# Patient Record
Sex: FEMALE | Race: WHITE | Employment: OTHER | URBAN - METROPOLITAN AREA
[De-identification: names, ages, dates, MRNs, and addresses within clinical notes are randomized per-mention and may not be internally consistent; named-entity substitution may affect disease eponyms.]

---

## 2019-12-16 ENCOUNTER — HOSPITAL ENCOUNTER (OUTPATIENT)
Age: 69
Setting detail: SPECIMEN
Discharge: HOME | End: 2019-12-16
Payer: MEDICARE

## 2019-12-16 LAB
ALBUMIN SERPL-MCNC: 4.3 GM/DL (ref 3.4–5)
ALP BLD-CCNC: 118 IU/L (ref 40–129)
ALT SERPL-CCNC: 26 U/L (ref 10–40)
ANION GAP SERPL CALCULATED.3IONS-SCNC: 18 MMOL/L (ref 4–16)
AST SERPL-CCNC: 18 IU/L (ref 15–37)
BASOPHILS ABSOLUTE: 0.1 K/CU MM
BASOPHILS RELATIVE PERCENT: 2.5 % (ref 0–1)
BILIRUB SERPL-MCNC: 0.2 MG/DL (ref 0–1)
BILIRUBIN DIRECT: 0.2 MG/DL (ref 0–0.3)
BILIRUBIN, INDIRECT: 0 MG/DL (ref 0–0.7)
BUN BLDV-MCNC: 19 MG/DL (ref 6–23)
CALCIUM SERPL-MCNC: 8.7 MG/DL (ref 8.3–10.6)
CHLORIDE BLD-SCNC: 101 MMOL/L (ref 99–110)
CO2: 23 MMOL/L (ref 21–32)
CREAT SERPL-MCNC: 1.2 MG/DL (ref 0.6–1.1)
DIFFERENTIAL TYPE: ABNORMAL
EOSINOPHILS ABSOLUTE: 0.2 K/CU MM
EOSINOPHILS RELATIVE PERCENT: 3.8 % (ref 0–3)
GFR AFRICAN AMERICAN: 54 ML/MIN/1.73M2
GFR NON-AFRICAN AMERICAN: 45 ML/MIN/1.73M2
GLUCOSE BLD-MCNC: 193 MG/DL (ref 70–99)
HCT VFR BLD CALC: 32 % (ref 37–47)
HEMOGLOBIN: 9.3 GM/DL (ref 12.5–16)
IMMATURE NEUTROPHIL %: 0.8 % (ref 0–0.43)
LYMPHOCYTES ABSOLUTE: 0.9 K/CU MM
LYMPHOCYTES RELATIVE PERCENT: 23.4 % (ref 24–44)
MCH RBC QN AUTO: 28.4 PG (ref 27–31)
MCHC RBC AUTO-ENTMCNC: 29.1 % (ref 32–36)
MCV RBC AUTO: 97.6 FL (ref 78–100)
MONOCYTES ABSOLUTE: 0.4 K/CU MM
MONOCYTES RELATIVE PERCENT: 9.9 % (ref 0–4)
NUCLEATED RBC %: 0 %
PDW BLD-RTO: 15.2 % (ref 11.7–14.9)
PLATELET # BLD: 189 K/CU MM (ref 140–440)
PMV BLD AUTO: 10.7 FL (ref 7.5–11.1)
POTASSIUM SERPL-SCNC: 4.1 MMOL/L (ref 3.5–5.1)
RBC # BLD: 3.28 M/CU MM (ref 4.2–5.4)
SEGMENTED NEUTROPHILS ABSOLUTE COUNT: 2.3 K/CU MM
SEGMENTED NEUTROPHILS RELATIVE PERCENT: 59.6 % (ref 36–66)
SODIUM BLD-SCNC: 142 MMOL/L (ref 135–145)
TOTAL IMMATURE NEUTOROPHIL: 0.03 K/CU MM
TOTAL NUCLEATED RBC: 0 K/CU MM
TOTAL PROTEIN: 6.4 GM/DL (ref 6.4–8.2)
WBC # BLD: 3.9 K/CU MM (ref 4–10.5)

## 2019-12-16 PROCEDURE — 80053 COMPREHEN METABOLIC PANEL: CPT

## 2019-12-16 PROCEDURE — 85025 COMPLETE CBC W/AUTO DIFF WBC: CPT

## 2019-12-16 PROCEDURE — 82248 BILIRUBIN DIRECT: CPT

## 2020-12-16 ENCOUNTER — HOSPITAL ENCOUNTER (OUTPATIENT)
Dept: CARDIAC REHAB | Age: 70
Setting detail: THERAPIES SERIES
Discharge: HOME | End: 2020-12-16
Payer: MEDICARE

## 2020-12-16 PROCEDURE — G0422 INTENS CARDIAC REHAB W/EXERC: HCPCS

## 2020-12-16 PROCEDURE — G0423 INTENS CARDIAC REHAB NO EXER: HCPCS

## 2020-12-21 ENCOUNTER — HOSPITAL ENCOUNTER (OUTPATIENT)
Dept: CARDIAC REHAB | Age: 70
Setting detail: THERAPIES SERIES
Discharge: HOME | End: 2020-12-21
Payer: MEDICARE

## 2020-12-21 LAB
GLUCOSE BLD-MCNC: 112 MG/DL (ref 70–99)
GLUCOSE BLD-MCNC: 137 MG/DL (ref 70–99)

## 2020-12-21 PROCEDURE — G0423 INTENS CARDIAC REHAB NO EXER: HCPCS

## 2020-12-21 PROCEDURE — 82962 GLUCOSE BLOOD TEST: CPT

## 2020-12-21 PROCEDURE — G0422 INTENS CARDIAC REHAB W/EXERC: HCPCS

## 2020-12-22 ENCOUNTER — HOSPITAL ENCOUNTER (OUTPATIENT)
Dept: CARDIAC REHAB | Age: 70
Setting detail: THERAPIES SERIES
Discharge: HOME | End: 2020-12-22
Payer: MEDICARE

## 2020-12-22 LAB
GLUCOSE BLD-MCNC: 121 MG/DL (ref 70–99)
GLUCOSE BLD-MCNC: 132 MG/DL (ref 70–99)

## 2020-12-22 PROCEDURE — G0423 INTENS CARDIAC REHAB NO EXER: HCPCS

## 2020-12-22 PROCEDURE — 82962 GLUCOSE BLOOD TEST: CPT

## 2020-12-22 PROCEDURE — G0422 INTENS CARDIAC REHAB W/EXERC: HCPCS

## 2020-12-24 ENCOUNTER — HOSPITAL ENCOUNTER (OUTPATIENT)
Dept: CARDIAC REHAB | Age: 70
Setting detail: THERAPIES SERIES
Discharge: HOME | End: 2020-12-24
Payer: MEDICARE

## 2020-12-24 LAB
GLUCOSE BLD-MCNC: 191 MG/DL (ref 70–99)
GLUCOSE BLD-MCNC: 262 MG/DL (ref 70–99)

## 2020-12-24 PROCEDURE — G0423 INTENS CARDIAC REHAB NO EXER: HCPCS

## 2020-12-24 PROCEDURE — G0422 INTENS CARDIAC REHAB W/EXERC: HCPCS

## 2020-12-24 PROCEDURE — 82962 GLUCOSE BLOOD TEST: CPT

## 2020-12-28 ENCOUNTER — HOSPITAL ENCOUNTER (OUTPATIENT)
Dept: CARDIAC REHAB | Age: 70
Setting detail: THERAPIES SERIES
Discharge: HOME | End: 2020-12-28
Payer: MEDICARE

## 2020-12-28 LAB — GLUCOSE BLD-MCNC: 134 MG/DL (ref 70–99)

## 2020-12-28 PROCEDURE — 82962 GLUCOSE BLOOD TEST: CPT

## 2020-12-28 PROCEDURE — G0423 INTENS CARDIAC REHAB NO EXER: HCPCS

## 2020-12-28 PROCEDURE — G0422 INTENS CARDIAC REHAB W/EXERC: HCPCS

## 2021-01-04 ENCOUNTER — HOSPITAL ENCOUNTER (OUTPATIENT)
Dept: CARDIAC REHAB | Age: 71
Setting detail: THERAPIES SERIES
Discharge: HOME | End: 2021-01-04
Payer: MEDICARE

## 2021-01-04 LAB
GLUCOSE BLD-MCNC: 119 MG/DL (ref 70–99)
GLUCOSE BLD-MCNC: 138 MG/DL (ref 70–99)

## 2021-01-04 PROCEDURE — G0422 INTENS CARDIAC REHAB W/EXERC: HCPCS

## 2021-01-04 PROCEDURE — 82962 GLUCOSE BLOOD TEST: CPT

## 2021-01-04 PROCEDURE — G0423 INTENS CARDIAC REHAB NO EXER: HCPCS

## 2021-01-05 ENCOUNTER — HOSPITAL ENCOUNTER (OUTPATIENT)
Dept: CARDIAC REHAB | Age: 71
Setting detail: THERAPIES SERIES
Discharge: HOME | End: 2021-01-05
Payer: MEDICARE

## 2021-01-05 LAB — GLUCOSE BLD-MCNC: 125 MG/DL (ref 70–99)

## 2021-01-05 PROCEDURE — G0422 INTENS CARDIAC REHAB W/EXERC: HCPCS

## 2021-01-05 PROCEDURE — G0423 INTENS CARDIAC REHAB NO EXER: HCPCS

## 2021-01-07 ENCOUNTER — HOSPITAL ENCOUNTER (OUTPATIENT)
Dept: CARDIAC REHAB | Age: 71
Setting detail: THERAPIES SERIES
Discharge: HOME | End: 2021-01-07
Payer: MEDICARE

## 2021-01-07 PROCEDURE — G0422 INTENS CARDIAC REHAB W/EXERC: HCPCS

## 2021-01-07 PROCEDURE — G0423 INTENS CARDIAC REHAB NO EXER: HCPCS

## 2021-01-12 ENCOUNTER — HOSPITAL ENCOUNTER (OUTPATIENT)
Dept: CARDIAC REHAB | Age: 71
Setting detail: THERAPIES SERIES
Discharge: HOME | End: 2021-01-12
Payer: MEDICARE

## 2021-01-12 PROCEDURE — G0423 INTENS CARDIAC REHAB NO EXER: HCPCS

## 2021-01-12 PROCEDURE — G0422 INTENS CARDIAC REHAB W/EXERC: HCPCS

## 2021-01-13 NOTE — PROGRESS NOTES
Outpatient intensive cardiac rehab nutrition one-on-one visit  01/12/2021  14:57-16:00    Client History:    Pertinent medications:insulin, ozempic, synthroid, will start chemo again 3 weeks on and 1 week off. Social hx: Lives with spouse. 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 Does not use tobacco. Drinks very little alcohol, sips of wine at passover. Spouse involved with grocery shopping. Pertinent Medical hx: valve replacement, myeloma with hx BMT, hyperinsulinemia, DM    Activity habits: Less active in past years with cancer dx and treatments and recent cardiac surgery. Used to ride stationary bike at home 30-40 minutes 3 times per week. Increasing activity past 3 weeks in rehab program.     Food/nutrition habits: Eats small meals, snacks. Breakfast often shredded wheat. Lunch may be homemade soup, leftovers. Eats small portions of meats, variety. Avoids shellfish due to allergy. Does not keep kosher. Trying to increase fruits. Lactose intolerant. Drinks frappuccino made with almond milk, medium size 2 times per week. Drinks mainly water. Anthropometrics:    Ht:61.5\" Wt: 223#   IBW: 107-117#   % of IBW: 190 BMI: 41.5, obesity class III    Weight hx: Prior to cardiac surgery weight 250#. Gradual loss over past months. Would like to continue loss. Diet hx: weight loss diets over adult life, very low calorie at times 500 per day with little loss. Admits best loss involved exercise.     Estimated needs: 1785 calories (based on current weight with [facility] and low active) reduce for loss to 1500     Readiness to change: important, very ready, confident in self care, good family support  Nutrition dx: Nutrition-related knowledge deficit related to limited exposure to meal plan as evidenced by recent cardiac surgery, desire for information    Nutrition Prescription: Consistent meal plan with CHO controlled meals, low sodium 1500 mg per day Nutrition Interventions: Discussion regarding Pritikin meal plan guidelines. Encouraged to continue working on increasing daily fruit/vegetable intake, limiting added sugars.      Nutrition related goals: reduce frappuccino to once per week and try with less added syrup, add vegetables to meals    Adherence/barriers to goals: no barriers at this time    Primary learner: attended alone  Education materials provided: Pritikin shopping list and CHO handouts  Method of education:   Explanation    Handouts   Teach back     Response to education:    Verbalized understanding          Rec/plan: Continue exercise and education classes in cardiac rehab program

## 2021-01-14 ENCOUNTER — HOSPITAL ENCOUNTER (OUTPATIENT)
Dept: CARDIAC REHAB | Age: 71
Setting detail: THERAPIES SERIES
Discharge: HOME | End: 2021-01-14
Payer: MEDICARE

## 2021-01-14 PROCEDURE — G0423 INTENS CARDIAC REHAB NO EXER: HCPCS

## 2021-01-14 PROCEDURE — G0422 INTENS CARDIAC REHAB W/EXERC: HCPCS

## 2021-01-18 ENCOUNTER — HOSPITAL ENCOUNTER (OUTPATIENT)
Dept: CARDIAC REHAB | Age: 71
Setting detail: THERAPIES SERIES
Discharge: HOME | End: 2021-01-18
Payer: MEDICARE

## 2021-01-18 PROCEDURE — G0423 INTENS CARDIAC REHAB NO EXER: HCPCS

## 2021-01-18 PROCEDURE — G0422 INTENS CARDIAC REHAB W/EXERC: HCPCS

## 2021-01-19 ENCOUNTER — HOSPITAL ENCOUNTER (OUTPATIENT)
Dept: CARDIAC REHAB | Age: 71
Setting detail: THERAPIES SERIES
Discharge: HOME | End: 2021-01-19
Payer: MEDICARE

## 2021-01-19 PROCEDURE — G0423 INTENS CARDIAC REHAB NO EXER: HCPCS

## 2021-01-19 PROCEDURE — 93798 PHYS/QHP OP CAR RHAB W/ECG: CPT

## 2021-01-19 PROCEDURE — G0422 INTENS CARDIAC REHAB W/EXERC: HCPCS

## 2021-01-21 ENCOUNTER — HOSPITAL ENCOUNTER (OUTPATIENT)
Dept: CARDIAC REHAB | Age: 71
Setting detail: THERAPIES SERIES
Discharge: HOME | End: 2021-01-21
Payer: MEDICARE

## 2021-01-25 ENCOUNTER — HOSPITAL ENCOUNTER (OUTPATIENT)
Dept: CARDIAC REHAB | Age: 71
Setting detail: THERAPIES SERIES
Discharge: HOME | End: 2021-01-25
Payer: MEDICARE

## 2021-01-25 PROCEDURE — G0423 INTENS CARDIAC REHAB NO EXER: HCPCS

## 2021-01-25 PROCEDURE — G0422 INTENS CARDIAC REHAB W/EXERC: HCPCS

## 2021-01-26 ENCOUNTER — HOSPITAL ENCOUNTER (OUTPATIENT)
Dept: CARDIAC REHAB | Age: 71
Setting detail: THERAPIES SERIES
Discharge: HOME | End: 2021-01-26
Payer: MEDICARE

## 2021-01-26 PROCEDURE — G0423 INTENS CARDIAC REHAB NO EXER: HCPCS

## 2021-01-26 PROCEDURE — G0422 INTENS CARDIAC REHAB W/EXERC: HCPCS

## 2021-01-28 ENCOUNTER — HOSPITAL ENCOUNTER (OUTPATIENT)
Dept: CARDIAC REHAB | Age: 71
Setting detail: THERAPIES SERIES
Discharge: HOME | End: 2021-01-28
Payer: MEDICARE

## 2021-01-28 PROCEDURE — G0423 INTENS CARDIAC REHAB NO EXER: HCPCS

## 2021-01-28 PROCEDURE — G0422 INTENS CARDIAC REHAB W/EXERC: HCPCS

## 2021-02-02 ENCOUNTER — HOSPITAL ENCOUNTER (OUTPATIENT)
Dept: CARDIAC REHAB | Age: 71
Setting detail: THERAPIES SERIES
Discharge: HOME | End: 2021-02-02
Payer: MEDICARE

## 2021-02-02 PROCEDURE — G0423 INTENS CARDIAC REHAB NO EXER: HCPCS

## 2021-02-02 PROCEDURE — G0422 INTENS CARDIAC REHAB W/EXERC: HCPCS

## 2021-02-04 ENCOUNTER — HOSPITAL ENCOUNTER (OUTPATIENT)
Dept: CARDIAC REHAB | Age: 71
Setting detail: THERAPIES SERIES
Discharge: HOME | End: 2021-02-04
Payer: MEDICARE

## 2021-02-04 PROCEDURE — G0423 INTENS CARDIAC REHAB NO EXER: HCPCS

## 2021-02-04 PROCEDURE — G0422 INTENS CARDIAC REHAB W/EXERC: HCPCS

## 2021-02-08 ENCOUNTER — HOSPITAL ENCOUNTER (OUTPATIENT)
Dept: CARDIAC REHAB | Age: 71
Setting detail: THERAPIES SERIES
Discharge: HOME | End: 2021-02-08
Payer: MEDICARE

## 2021-02-08 PROCEDURE — G0423 INTENS CARDIAC REHAB NO EXER: HCPCS

## 2021-02-08 PROCEDURE — G0422 INTENS CARDIAC REHAB W/EXERC: HCPCS

## 2021-02-11 ENCOUNTER — HOSPITAL ENCOUNTER (OUTPATIENT)
Dept: CARDIAC REHAB | Age: 71
Setting detail: THERAPIES SERIES
Discharge: HOME | End: 2021-02-11
Payer: MEDICARE

## 2021-02-11 PROCEDURE — G0422 INTENS CARDIAC REHAB W/EXERC: HCPCS

## 2021-02-11 PROCEDURE — G0423 INTENS CARDIAC REHAB NO EXER: HCPCS

## 2021-02-15 ENCOUNTER — HOSPITAL ENCOUNTER (OUTPATIENT)
Dept: CARDIAC REHAB | Age: 71
Setting detail: THERAPIES SERIES
Discharge: HOME | End: 2021-02-15
Payer: MEDICARE

## 2021-02-15 PROCEDURE — G0422 INTENS CARDIAC REHAB W/EXERC: HCPCS

## 2021-02-15 PROCEDURE — G0423 INTENS CARDIAC REHAB NO EXER: HCPCS

## 2021-03-01 ENCOUNTER — HOSPITAL ENCOUNTER (OUTPATIENT)
Dept: CARDIAC REHAB | Age: 71
Setting detail: THERAPIES SERIES
Discharge: HOME | End: 2021-03-01
Payer: MEDICARE

## 2021-03-01 PROCEDURE — G0422 INTENS CARDIAC REHAB W/EXERC: HCPCS

## 2021-03-01 PROCEDURE — G0423 INTENS CARDIAC REHAB NO EXER: HCPCS

## 2021-03-02 ENCOUNTER — HOSPITAL ENCOUNTER (OUTPATIENT)
Dept: CARDIAC REHAB | Age: 71
Setting detail: THERAPIES SERIES
Discharge: HOME | End: 2021-03-02
Payer: MEDICARE

## 2021-03-02 PROCEDURE — G0423 INTENS CARDIAC REHAB NO EXER: HCPCS

## 2021-03-02 PROCEDURE — G0422 INTENS CARDIAC REHAB W/EXERC: HCPCS

## 2021-03-04 ENCOUNTER — HOSPITAL ENCOUNTER (OUTPATIENT)
Dept: CARDIAC REHAB | Age: 71
Setting detail: THERAPIES SERIES
Discharge: HOME | End: 2021-03-04
Payer: MEDICARE

## 2021-03-04 PROCEDURE — G0422 INTENS CARDIAC REHAB W/EXERC: HCPCS

## 2021-03-04 PROCEDURE — G0423 INTENS CARDIAC REHAB NO EXER: HCPCS

## 2021-03-08 ENCOUNTER — HOSPITAL ENCOUNTER (OUTPATIENT)
Dept: CARDIAC REHAB | Age: 71
Setting detail: THERAPIES SERIES
Discharge: HOME | End: 2021-03-08
Payer: MEDICARE

## 2021-03-08 PROCEDURE — G0422 INTENS CARDIAC REHAB W/EXERC: HCPCS

## 2021-03-08 PROCEDURE — G0423 INTENS CARDIAC REHAB NO EXER: HCPCS

## 2021-03-09 ENCOUNTER — HOSPITAL ENCOUNTER (OUTPATIENT)
Dept: CARDIAC REHAB | Age: 71
Setting detail: THERAPIES SERIES
Discharge: HOME | End: 2021-03-09
Payer: MEDICARE

## 2021-03-09 PROCEDURE — G0423 INTENS CARDIAC REHAB NO EXER: HCPCS

## 2021-03-09 PROCEDURE — G0422 INTENS CARDIAC REHAB W/EXERC: HCPCS

## 2021-08-16 ENCOUNTER — HOSPITAL ENCOUNTER (OUTPATIENT)
Age: 71
Discharge: HOME | End: 2021-08-16
Payer: MEDICARE

## 2021-08-16 LAB
BASOPHILS ABSOLUTE: 0 K/CU MM
BASOPHILS RELATIVE PERCENT: 0.3 % (ref 0–1)
C-REACTIVE PROTEIN, HIGH SENSITIVITY: 1.1 MG/L
DIFFERENTIAL TYPE: ABNORMAL
EOSINOPHILS ABSOLUTE: 0.1 K/CU MM
EOSINOPHILS RELATIVE PERCENT: 0.8 % (ref 0–3)
ERYTHROCYTE SEDIMENTATION RATE: 45 MM/HR (ref 0–30)
HCT VFR BLD CALC: 38.4 % (ref 37–47)
HEMOGLOBIN: 11.7 GM/DL (ref 12.5–16)
IMMATURE NEUTROPHIL %: 1 % (ref 0–0.43)
LYMPHOCYTES ABSOLUTE: 0.8 K/CU MM
LYMPHOCYTES RELATIVE PERCENT: 13.1 % (ref 24–44)
MCH RBC QN AUTO: 30.6 PG (ref 27–31)
MCHC RBC AUTO-ENTMCNC: 30.5 % (ref 32–36)
MCV RBC AUTO: 100.5 FL (ref 78–100)
MONOCYTES ABSOLUTE: 0.7 K/CU MM
MONOCYTES RELATIVE PERCENT: 10.6 % (ref 0–4)
NUCLEATED RBC %: 0.3 %
PDW BLD-RTO: 14.5 % (ref 11.7–14.9)
PLATELET # BLD: 191 K/CU MM (ref 140–440)
PMV BLD AUTO: 12 FL (ref 7.5–11.1)
RBC # BLD: 3.82 M/CU MM (ref 4.2–5.4)
SEGMENTED NEUTROPHILS ABSOLUTE COUNT: 4.5 K/CU MM
SEGMENTED NEUTROPHILS RELATIVE PERCENT: 74.2 % (ref 36–66)
TOTAL IMMATURE NEUTOROPHIL: 0.06 K/CU MM
TOTAL NUCLEATED RBC: 0 K/CU MM
WBC # BLD: 6.1 K/CU MM (ref 4–10.5)

## 2021-08-16 PROCEDURE — 85025 COMPLETE CBC W/AUTO DIFF WBC: CPT

## 2021-08-16 PROCEDURE — 86140 C-REACTIVE PROTEIN: CPT

## 2021-08-16 PROCEDURE — 36415 COLL VENOUS BLD VENIPUNCTURE: CPT

## 2021-08-16 PROCEDURE — 85652 RBC SED RATE AUTOMATED: CPT

## 2022-04-19 ENCOUNTER — HOSPITAL ENCOUNTER (OUTPATIENT)
Dept: ULTRASOUND IMAGING | Age: 72
Discharge: HOME | End: 2022-04-19
Payer: MEDICARE

## 2022-04-19 DIAGNOSIS — R60.0 LEG EDEMA: ICD-10-CM

## 2022-04-19 PROCEDURE — 93971 EXTREMITY STUDY: CPT

## 2023-01-30 ENCOUNTER — HOSPITAL ENCOUNTER (OUTPATIENT)
Age: 73
Discharge: HOME | End: 2023-01-30
Payer: MEDICARE

## 2023-01-30 LAB — CORTISOL - AM: 1.1 UG/DL (ref 6–18.4)

## 2023-01-30 PROCEDURE — 82533 TOTAL CORTISOL: CPT

## 2023-01-30 PROCEDURE — 36415 COLL VENOUS BLD VENIPUNCTURE: CPT

## 2023-07-14 ENCOUNTER — APPOINTMENT (OUTPATIENT)
Dept: GENERAL RADIOLOGY | Age: 73
DRG: 178 | End: 2023-07-14
Payer: MEDICARE

## 2023-07-14 ENCOUNTER — APPOINTMENT (OUTPATIENT)
Dept: CT IMAGING | Age: 73
DRG: 178 | End: 2023-07-14
Payer: MEDICARE

## 2023-07-14 ENCOUNTER — HOSPITAL ENCOUNTER (INPATIENT)
Age: 73
LOS: 3 days | Discharge: HOME | DRG: 178 | End: 2023-07-17
Attending: EMERGENCY MEDICINE | Admitting: STUDENT IN AN ORGANIZED HEALTH CARE EDUCATION/TRAINING PROGRAM
Payer: MEDICARE

## 2023-07-14 DIAGNOSIS — E83.42 HYPOMAGNESEMIA: ICD-10-CM

## 2023-07-14 DIAGNOSIS — N17.9 AKI (ACUTE KIDNEY INJURY) (HCC): Primary | ICD-10-CM

## 2023-07-14 DIAGNOSIS — E04.1 THYROID NODULE: ICD-10-CM

## 2023-07-14 LAB
ALBUMIN SERPL-MCNC: 4.4 GM/DL (ref 3.4–5)
ALP BLD-CCNC: 97 IU/L (ref 40–129)
ALT SERPL-CCNC: 11 U/L (ref 10–40)
ANION GAP SERPL CALCULATED.3IONS-SCNC: 16 MMOL/L (ref 4–16)
AST SERPL-CCNC: 13 IU/L (ref 15–37)
BACTERIA: ABNORMAL /HPF
BANDED NEUTROPHILS ABSOLUTE COUNT: 0.36 K/CU MM
BANDED NEUTROPHILS RELATIVE PERCENT: 7 % (ref 5–11)
BASOPHILS ABSOLUTE: 0.1 K/CU MM
BASOPHILS RELATIVE PERCENT: 1 % (ref 0–1)
BILIRUB SERPL-MCNC: 0.4 MG/DL (ref 0–1)
BILIRUBIN URINE: NEGATIVE MG/DL
BLOOD, URINE: ABNORMAL
BUN SERPL-MCNC: 30 MG/DL (ref 6–23)
CALCIUM SERPL-MCNC: 9.6 MG/DL (ref 8.3–10.6)
CHLORIDE BLD-SCNC: 98 MMOL/L (ref 99–110)
CLARITY: CLEAR
CO2: 24 MMOL/L (ref 21–32)
COLOR: YELLOW
CREAT SERPL-MCNC: 2.6 MG/DL (ref 0.6–1.1)
DIFFERENTIAL TYPE: ABNORMAL
EOSINOPHILS ABSOLUTE: 0.6 K/CU MM
EOSINOPHILS RELATIVE PERCENT: 12 % (ref 0–3)
GFR SERPL CREATININE-BSD FRML MDRD: 19 ML/MIN/1.73M2
GLUCOSE SERPL-MCNC: 143 MG/DL (ref 70–99)
GLUCOSE, URINE: NEGATIVE MG/DL
HCT VFR BLD CALC: 36.7 % (ref 37–47)
HEMOGLOBIN: 10.8 GM/DL (ref 12.5–16)
KETONES, URINE: NEGATIVE MG/DL
LACTIC ACID, SEPSIS: 1.3 MMOL/L (ref 0.5–1.9)
LEUKOCYTE ESTERASE, URINE: ABNORMAL
LYMPHOCYTES ABSOLUTE: 0.3 K/CU MM
LYMPHOCYTES RELATIVE PERCENT: 6 % (ref 24–44)
MAGNESIUM: 1.1 MG/DL (ref 1.8–2.4)
MCH RBC QN AUTO: 29.6 PG (ref 27–31)
MCHC RBC AUTO-ENTMCNC: 29.4 % (ref 32–36)
MCV RBC AUTO: 100.5 FL (ref 78–100)
METAMYELOCYTES ABSOLUTE COUNT: 0.15 K/CU MM
METAMYELOCYTES PERCENT: 3 %
MONOCYTES ABSOLUTE: 0.4 K/CU MM
MONOCYTES RELATIVE PERCENT: 7 % (ref 0–4)
MUCUS: ABNORMAL HPF
MYELOCYTE PERCENT: 2 %
MYELOCYTES ABSOLUTE COUNT: 0.1 K/CU MM
NITRITE URINE, QUANTITATIVE: NEGATIVE
PDW BLD-RTO: 15.4 % (ref 11.7–14.9)
PH, URINE: 6 (ref 5–8)
PLATELET # BLD: 154 K/CU MM (ref 140–440)
PMV BLD AUTO: 10.1 FL (ref 7.5–11.1)
POTASSIUM SERPL-SCNC: 3.9 MMOL/L (ref 3.5–5.1)
PROTEIN UA: ABNORMAL MG/DL
RBC # BLD: 3.65 M/CU MM (ref 4.2–5.4)
RBC URINE: 3 /HPF (ref 0–6)
SEGMENTED NEUTROPHILS ABSOLUTE COUNT: 3.1 K/CU MM
SEGMENTED NEUTROPHILS RELATIVE PERCENT: 62 % (ref 36–66)
SODIUM BLD-SCNC: 138 MMOL/L (ref 135–145)
SPECIFIC GRAVITY UA: 1.01 (ref 1–1.03)
TOTAL PROTEIN: 7.5 GM/DL (ref 6.4–8.2)
TRICHOMONAS: ABNORMAL /HPF
UROBILINOGEN, URINE: 0.2 MG/DL (ref 0.2–1)
WBC # BLD: 5.1 K/CU MM (ref 4–10.5)
WBC CLUMP: ABNORMAL /HPF
WBC UA: 43 /HPF (ref 0–5)
YEAST: ABNORMAL /HPF

## 2023-07-14 PROCEDURE — 2580000003 HC RX 258: Performed by: EMERGENCY MEDICINE

## 2023-07-14 PROCEDURE — 87088 URINE BACTERIA CULTURE: CPT

## 2023-07-14 PROCEDURE — 70450 CT HEAD/BRAIN W/O DYE: CPT

## 2023-07-14 PROCEDURE — 80053 COMPREHEN METABOLIC PANEL: CPT

## 2023-07-14 PROCEDURE — 72125 CT NECK SPINE W/O DYE: CPT

## 2023-07-14 PROCEDURE — 99285 EMERGENCY DEPT VISIT HI MDM: CPT

## 2023-07-14 PROCEDURE — 85007 BL SMEAR W/DIFF WBC COUNT: CPT

## 2023-07-14 PROCEDURE — 83735 ASSAY OF MAGNESIUM: CPT

## 2023-07-14 PROCEDURE — 93005 ELECTROCARDIOGRAM TRACING: CPT | Performed by: EMERGENCY MEDICINE

## 2023-07-14 PROCEDURE — 83605 ASSAY OF LACTIC ACID: CPT

## 2023-07-14 PROCEDURE — 96366 THER/PROPH/DIAG IV INF ADDON: CPT

## 2023-07-14 PROCEDURE — 87086 URINE CULTURE/COLONY COUNT: CPT

## 2023-07-14 PROCEDURE — 6360000002 HC RX W HCPCS: Performed by: EMERGENCY MEDICINE

## 2023-07-14 PROCEDURE — 96365 THER/PROPH/DIAG IV INF INIT: CPT

## 2023-07-14 PROCEDURE — 85027 COMPLETE CBC AUTOMATED: CPT

## 2023-07-14 PROCEDURE — 81001 URINALYSIS AUTO W/SCOPE: CPT

## 2023-07-14 PROCEDURE — 87186 SC STD MICRODIL/AGAR DIL: CPT

## 2023-07-14 PROCEDURE — 71045 X-RAY EXAM CHEST 1 VIEW: CPT

## 2023-07-14 PROCEDURE — 1200000000 HC SEMI PRIVATE

## 2023-07-14 RX ORDER — METOPROLOL SUCCINATE 50 MG/1
50 TABLET, EXTENDED RELEASE ORAL DAILY
COMMUNITY
Start: 2021-01-27

## 2023-07-14 RX ORDER — INSULIN GLARGINE 100 [IU]/ML
INJECTION, SOLUTION SUBCUTANEOUS
COMMUNITY
Start: 2023-06-15

## 2023-07-14 RX ORDER — 0.9 % SODIUM CHLORIDE 0.9 %
1000 INTRAVENOUS SOLUTION INTRAVENOUS ONCE
Status: COMPLETED | OUTPATIENT
Start: 2023-07-14 | End: 2023-07-14

## 2023-07-14 RX ORDER — OXYCODONE HYDROCHLORIDE 5 MG/1
TABLET ORAL
Status: ON HOLD | COMMUNITY
Start: 2023-07-06 | End: 2023-07-15

## 2023-07-14 RX ORDER — FLUCONAZOLE 150 MG/1
150 TABLET ORAL WEEKLY
Status: ON HOLD | COMMUNITY
Start: 2023-06-19 | End: 2023-07-17 | Stop reason: HOSPADM

## 2023-07-14 RX ORDER — CETIRIZINE 2.4 MG/ML
SOLUTION/ DROPS OPHTHALMIC
COMMUNITY
Start: 2023-06-04

## 2023-07-14 RX ORDER — LEVOTHYROXINE SODIUM 88 UG/1
1 TABLET ORAL
COMMUNITY
Start: 2023-01-31

## 2023-07-14 RX ORDER — ACYCLOVIR 400 MG/1
400 TABLET ORAL 2 TIMES DAILY
COMMUNITY
Start: 2022-03-29

## 2023-07-14 RX ORDER — DIPHENHYDRAMINE HCL 50 MG
50 CAPSULE ORAL PRN
COMMUNITY

## 2023-07-14 RX ORDER — ALBUTEROL SULFATE 90 UG/1
1-2 AEROSOL, METERED RESPIRATORY (INHALATION) 4 TIMES DAILY PRN
COMMUNITY

## 2023-07-14 RX ORDER — GABAPENTIN 300 MG/1
300 CAPSULE ORAL 4 TIMES DAILY
Status: ON HOLD | COMMUNITY
Start: 2023-05-21 | End: 2023-07-17 | Stop reason: HOSPADM

## 2023-07-14 RX ORDER — LORATADINE 10 MG/1
TABLET ORAL DAILY
COMMUNITY

## 2023-07-14 RX ORDER — MAGNESIUM SULFATE IN WATER 40 MG/ML
2000 INJECTION, SOLUTION INTRAVENOUS ONCE
Status: COMPLETED | OUTPATIENT
Start: 2023-07-14 | End: 2023-07-15

## 2023-07-14 RX ORDER — HYOSCYAMINE SULFATE 0.125 MG
TABLET,DISINTEGRATING ORAL EVERY 4 HOURS PRN
COMMUNITY

## 2023-07-14 RX ORDER — 0.9 % SODIUM CHLORIDE 0.9 %
1000 INTRAVENOUS SOLUTION INTRAVENOUS ONCE
Status: COMPLETED | OUTPATIENT
Start: 2023-07-14 | End: 2023-07-15

## 2023-07-14 RX ORDER — ASPIRIN 81 MG/1
81 TABLET, CHEWABLE ORAL DAILY
COMMUNITY

## 2023-07-14 RX ORDER — LEVOTHYROXINE SODIUM 0.07 MG/1
TABLET ORAL
COMMUNITY
Start: 2023-06-30

## 2023-07-14 RX ORDER — ESCITALOPRAM OXALATE 20 MG/1
1 TABLET ORAL DAILY
COMMUNITY
Start: 2022-12-14

## 2023-07-14 RX ORDER — INSULIN LISPRO 100 [IU]/ML
INJECTION, SOLUTION INTRAVENOUS; SUBCUTANEOUS
COMMUNITY
Start: 2020-11-06

## 2023-07-14 RX ORDER — ROSUVASTATIN CALCIUM 20 MG/1
20 TABLET, COATED ORAL DAILY
Qty: 30 TABLET | Refills: 11 | COMMUNITY
Start: 2023-02-14 | End: 2024-02-14

## 2023-07-14 RX ORDER — DEXAMETHASONE 4 MG/1
TABLET ORAL
COMMUNITY
Start: 2023-04-10

## 2023-07-14 RX ORDER — MAGNESIUM SULFATE IN WATER 40 MG/ML
2000 INJECTION, SOLUTION INTRAVENOUS ONCE
Status: DISCONTINUED | OUTPATIENT
Start: 2023-07-14 | End: 2023-07-15

## 2023-07-14 RX ORDER — FLUTICASONE FUROATE AND VILANTEROL 100; 25 UG/1; UG/1
1 POWDER RESPIRATORY (INHALATION) DAILY
COMMUNITY
Start: 2020-11-13

## 2023-07-14 RX ORDER — SEMAGLUTIDE 1.34 MG/ML
INJECTION, SOLUTION SUBCUTANEOUS
COMMUNITY
Start: 2022-08-24

## 2023-07-14 RX ORDER — PANTOPRAZOLE SODIUM 40 MG/1
1 TABLET, DELAYED RELEASE ORAL DAILY
COMMUNITY
Start: 2020-10-09

## 2023-07-14 RX ORDER — LIDOCAINE 50 MG/G
PATCH TOPICAL
COMMUNITY
Start: 2023-01-17

## 2023-07-14 RX ORDER — ASCORBIC ACID 500 MG
500 TABLET ORAL
COMMUNITY

## 2023-07-14 RX ADMIN — MAGNESIUM SULFATE HEPTAHYDRATE 2000 MG: 40 INJECTION, SOLUTION INTRAVENOUS at 20:58

## 2023-07-14 RX ADMIN — SODIUM CHLORIDE 1000 ML: 9 INJECTION, SOLUTION INTRAVENOUS at 22:38

## 2023-07-14 RX ADMIN — SODIUM CHLORIDE 1000 ML: 9 INJECTION, SOLUTION INTRAVENOUS at 21:11

## 2023-07-14 ASSESSMENT — PAIN DESCRIPTION - LOCATION: LOCATION: NECK;SHOULDER;KNEE

## 2023-07-14 ASSESSMENT — PAIN DESCRIPTION - PAIN TYPE: TYPE: CHRONIC PAIN

## 2023-07-14 NOTE — ED TRIAGE NOTES
Pt to ED today stating she tested positive for covid today. States yesterday she was running fever highest being 100.8.  states she also fell last night twice because she was so weak. Pt worried for being positive for covid. Pt did have right kidney removal this past Wednesday.

## 2023-07-15 LAB
ANION GAP SERPL CALCULATED.3IONS-SCNC: 14 MMOL/L (ref 4–16)
B PARAP IS1001 DNA NPH QL NAA+NON-PROBE: NOT DETECTED
B PERT.PT PRMT NPH QL NAA+NON-PROBE: NOT DETECTED
BASOPHILS ABSOLUTE: 0.1 K/CU MM
BASOPHILS RELATIVE PERCENT: 1 % (ref 0–1)
BUN SERPL-MCNC: 27 MG/DL (ref 6–23)
C PNEUM DNA NPH QL NAA+NON-PROBE: NOT DETECTED
CALCIUM SERPL-MCNC: 8.7 MG/DL (ref 8.3–10.6)
CHLORIDE BLD-SCNC: 104 MMOL/L (ref 99–110)
CO2: 21 MMOL/L (ref 21–32)
CREAT SERPL-MCNC: 2.4 MG/DL (ref 0.6–1.1)
DIFFERENTIAL TYPE: ABNORMAL
EOSINOPHILS ABSOLUTE: 0.4 K/CU MM
EOSINOPHILS RELATIVE PERCENT: 7.5 % (ref 0–3)
ESTIMATED AVERAGE GLUCOSE: 128 MG/DL
FLUAV H1 2009 PAN RNA NPH NAA+NON-PROBE: NOT DETECTED
FLUAV H1 RNA NPH QL NAA+NON-PROBE: NOT DETECTED
FLUAV H3 RNA NPH QL NAA+NON-PROBE: NOT DETECTED
FLUAV RNA NPH QL NAA+NON-PROBE: NOT DETECTED
FLUBV RNA NPH QL NAA+NON-PROBE: NOT DETECTED
GFR SERPL CREATININE-BSD FRML MDRD: 21 ML/MIN/1.73M2
GLUCOSE BLD-MCNC: 131 MG/DL (ref 70–99)
GLUCOSE BLD-MCNC: 133 MG/DL (ref 70–99)
GLUCOSE SERPL-MCNC: 136 MG/DL (ref 70–99)
HADV DNA NPH QL NAA+NON-PROBE: NOT DETECTED
HBA1C MFR BLD: 6.1 % (ref 4.2–6.3)
HCOV 229E RNA NPH QL NAA+NON-PROBE: NOT DETECTED
HCOV HKU1 RNA NPH QL NAA+NON-PROBE: NOT DETECTED
HCOV NL63 RNA NPH QL NAA+NON-PROBE: NOT DETECTED
HCOV OC43 RNA NPH QL NAA+NON-PROBE: NOT DETECTED
HCT VFR BLD CALC: 31.3 % (ref 37–47)
HEMOGLOBIN: 9.7 GM/DL (ref 12.5–16)
HMPV RNA NPH QL NAA+NON-PROBE: NOT DETECTED
HPIV1 RNA NPH QL NAA+NON-PROBE: NOT DETECTED
HPIV2 RNA NPH QL NAA+NON-PROBE: NOT DETECTED
HPIV3 RNA NPH QL NAA+NON-PROBE: NOT DETECTED
HPIV4 RNA NPH QL NAA+NON-PROBE: NOT DETECTED
IMMATURE NEUTROPHIL %: 1.2 % (ref 0–0.43)
INR BLD: 0.9 INDEX
LYMPHOCYTES ABSOLUTE: 0.3 K/CU MM
LYMPHOCYTES RELATIVE PERCENT: 6.4 % (ref 24–44)
M PNEUMO DNA NPH QL NAA+NON-PROBE: NOT DETECTED
MAGNESIUM: 2.2 MG/DL (ref 1.8–2.4)
MCH RBC QN AUTO: 30.7 PG (ref 27–31)
MCHC RBC AUTO-ENTMCNC: 31 % (ref 32–36)
MCV RBC AUTO: 99.1 FL (ref 78–100)
MONOCYTES ABSOLUTE: 0.7 K/CU MM
MONOCYTES RELATIVE PERCENT: 12.7 % (ref 0–4)
NUCLEATED RBC %: 0 %
PDW BLD-RTO: 15.7 % (ref 11.7–14.9)
PLATELET # BLD: 135 K/CU MM (ref 140–440)
PMV BLD AUTO: 9.7 FL (ref 7.5–11.1)
POTASSIUM SERPL-SCNC: 4.1 MMOL/L (ref 3.5–5.1)
PROTHROMBIN TIME: 12.8 SECONDS (ref 11.7–14.5)
RBC # BLD: 3.16 M/CU MM (ref 4.2–5.4)
RSV RNA NPH QL NAA+NON-PROBE: NOT DETECTED
RV+EV RNA NPH QL NAA+NON-PROBE: NOT DETECTED
SARS-COV-2 RNA NPH QL NAA+NON-PROBE: ABNORMAL
SEGMENTED NEUTROPHILS ABSOLUTE COUNT: 3.7 K/CU MM
SEGMENTED NEUTROPHILS RELATIVE PERCENT: 71.2 % (ref 36–66)
SODIUM BLD-SCNC: 139 MMOL/L (ref 135–145)
TOTAL CK: 135 IU/L (ref 26–140)
TOTAL IMMATURE NEUTOROPHIL: 0.06 K/CU MM
TOTAL NUCLEATED RBC: 0 K/CU MM
TSH SERPL DL<=0.005 MIU/L-ACNC: 0.22 UIU/ML (ref 0.27–4.2)
WBC # BLD: 5.2 K/CU MM (ref 4–10.5)

## 2023-07-15 PROCEDURE — 83036 HEMOGLOBIN GLYCOSYLATED A1C: CPT

## 2023-07-15 PROCEDURE — 94664 DEMO&/EVAL PT USE INHALER: CPT

## 2023-07-15 PROCEDURE — 2580000003 HC RX 258: Performed by: INTERNAL MEDICINE

## 2023-07-15 PROCEDURE — 84439 ASSAY OF FREE THYROXINE: CPT

## 2023-07-15 PROCEDURE — 6370000000 HC RX 637 (ALT 250 FOR IP): Performed by: STUDENT IN AN ORGANIZED HEALTH CARE EDUCATION/TRAINING PROGRAM

## 2023-07-15 PROCEDURE — 6360000002 HC RX W HCPCS: Performed by: STUDENT IN AN ORGANIZED HEALTH CARE EDUCATION/TRAINING PROGRAM

## 2023-07-15 PROCEDURE — 0202U NFCT DS 22 TRGT SARS-COV-2: CPT

## 2023-07-15 PROCEDURE — 2580000003 HC RX 258: Performed by: STUDENT IN AN ORGANIZED HEALTH CARE EDUCATION/TRAINING PROGRAM

## 2023-07-15 PROCEDURE — 85610 PROTHROMBIN TIME: CPT

## 2023-07-15 PROCEDURE — 82306 VITAMIN D 25 HYDROXY: CPT

## 2023-07-15 PROCEDURE — 1200000000 HC SEMI PRIVATE

## 2023-07-15 PROCEDURE — 85025 COMPLETE CBC W/AUTO DIFF WBC: CPT

## 2023-07-15 PROCEDURE — 36415 COLL VENOUS BLD VENIPUNCTURE: CPT

## 2023-07-15 PROCEDURE — 80048 BASIC METABOLIC PNL TOTAL CA: CPT

## 2023-07-15 PROCEDURE — 82550 ASSAY OF CK (CPK): CPT

## 2023-07-15 PROCEDURE — 83735 ASSAY OF MAGNESIUM: CPT

## 2023-07-15 PROCEDURE — 94640 AIRWAY INHALATION TREATMENT: CPT

## 2023-07-15 PROCEDURE — 2500000003 HC RX 250 WO HCPCS: Performed by: INTERNAL MEDICINE

## 2023-07-15 PROCEDURE — 82962 GLUCOSE BLOOD TEST: CPT

## 2023-07-15 PROCEDURE — 94761 N-INVAS EAR/PLS OXIMETRY MLT: CPT

## 2023-07-15 PROCEDURE — 6360000002 HC RX W HCPCS: Performed by: INTERNAL MEDICINE

## 2023-07-15 PROCEDURE — 84443 ASSAY THYROID STIM HORMONE: CPT

## 2023-07-15 RX ORDER — SODIUM CHLORIDE, SODIUM LACTATE, POTASSIUM CHLORIDE, CALCIUM CHLORIDE 600; 310; 30; 20 MG/100ML; MG/100ML; MG/100ML; MG/100ML
INJECTION, SOLUTION INTRAVENOUS CONTINUOUS
Status: DISPENSED | OUTPATIENT
Start: 2023-07-15 | End: 2023-07-16

## 2023-07-15 RX ORDER — SODIUM CHLORIDE 0.9 % (FLUSH) 0.9 %
5-40 SYRINGE (ML) INJECTION EVERY 12 HOURS SCHEDULED
Status: DISCONTINUED | OUTPATIENT
Start: 2023-07-15 | End: 2023-07-17 | Stop reason: HOSPADM

## 2023-07-15 RX ORDER — LEVOTHYROXINE SODIUM 88 UG/1
88 TABLET ORAL DAILY
Status: DISCONTINUED | OUTPATIENT
Start: 2023-07-15 | End: 2023-07-17 | Stop reason: HOSPADM

## 2023-07-15 RX ORDER — ONDANSETRON 4 MG/1
4 TABLET, ORALLY DISINTEGRATING ORAL EVERY 8 HOURS PRN
Status: DISCONTINUED | OUTPATIENT
Start: 2023-07-15 | End: 2023-07-17 | Stop reason: HOSPADM

## 2023-07-15 RX ORDER — SODIUM CHLORIDE, SODIUM LACTATE, POTASSIUM CHLORIDE, CALCIUM CHLORIDE 600; 310; 30; 20 MG/100ML; MG/100ML; MG/100ML; MG/100ML
INJECTION, SOLUTION INTRAVENOUS CONTINUOUS
Status: ACTIVE | OUTPATIENT
Start: 2023-07-15 | End: 2023-07-15

## 2023-07-15 RX ORDER — ACETAMINOPHEN 325 MG/1
650 TABLET ORAL EVERY 6 HOURS PRN
Status: DISCONTINUED | OUTPATIENT
Start: 2023-07-15 | End: 2023-07-17 | Stop reason: HOSPADM

## 2023-07-15 RX ORDER — SODIUM CHLORIDE 9 MG/ML
INJECTION, SOLUTION INTRAVENOUS PRN
Status: DISCONTINUED | OUTPATIENT
Start: 2023-07-15 | End: 2023-07-17 | Stop reason: HOSPADM

## 2023-07-15 RX ORDER — GABAPENTIN 100 MG/1
100 CAPSULE ORAL 4 TIMES DAILY
Status: DISCONTINUED | OUTPATIENT
Start: 2023-07-15 | End: 2023-07-17 | Stop reason: HOSPADM

## 2023-07-15 RX ORDER — INSULIN LISPRO 100 [IU]/ML
0-4 INJECTION, SOLUTION INTRAVENOUS; SUBCUTANEOUS NIGHTLY
Status: DISCONTINUED | OUTPATIENT
Start: 2023-07-15 | End: 2023-07-17 | Stop reason: HOSPADM

## 2023-07-15 RX ORDER — POLYETHYLENE GLYCOL 3350 17 G/17G
17 POWDER, FOR SOLUTION ORAL DAILY PRN
Status: DISCONTINUED | OUTPATIENT
Start: 2023-07-15 | End: 2023-07-17 | Stop reason: HOSPADM

## 2023-07-15 RX ORDER — BUDESONIDE AND FORMOTEROL FUMARATE DIHYDRATE 80; 4.5 UG/1; UG/1
2 AEROSOL RESPIRATORY (INHALATION)
Status: DISCONTINUED | OUTPATIENT
Start: 2023-07-15 | End: 2023-07-17 | Stop reason: HOSPADM

## 2023-07-15 RX ORDER — ASPIRIN 81 MG/1
81 TABLET, CHEWABLE ORAL DAILY
Status: DISCONTINUED | OUTPATIENT
Start: 2023-07-15 | End: 2023-07-17 | Stop reason: HOSPADM

## 2023-07-15 RX ORDER — ACETAMINOPHEN 650 MG/1
650 SUPPOSITORY RECTAL EVERY 6 HOURS PRN
Status: DISCONTINUED | OUTPATIENT
Start: 2023-07-15 | End: 2023-07-17 | Stop reason: HOSPADM

## 2023-07-15 RX ORDER — ACYCLOVIR 200 MG/1
400 CAPSULE ORAL 2 TIMES DAILY
Status: DISCONTINUED | OUTPATIENT
Start: 2023-07-15 | End: 2023-07-17 | Stop reason: HOSPADM

## 2023-07-15 RX ORDER — ONDANSETRON 2 MG/ML
4 INJECTION INTRAMUSCULAR; INTRAVENOUS EVERY 6 HOURS PRN
Status: DISCONTINUED | OUTPATIENT
Start: 2023-07-15 | End: 2023-07-17 | Stop reason: HOSPADM

## 2023-07-15 RX ORDER — PANTOPRAZOLE SODIUM 40 MG/1
40 TABLET, DELAYED RELEASE ORAL DAILY
Status: DISCONTINUED | OUTPATIENT
Start: 2023-07-15 | End: 2023-07-17 | Stop reason: HOSPADM

## 2023-07-15 RX ORDER — GLUCAGON 1 MG/ML
1 KIT INJECTION PRN
Status: DISCONTINUED | OUTPATIENT
Start: 2023-07-15 | End: 2023-07-17 | Stop reason: HOSPADM

## 2023-07-15 RX ORDER — INSULIN GLARGINE 100 [IU]/ML
15 INJECTION, SOLUTION SUBCUTANEOUS NIGHTLY
Status: DISCONTINUED | OUTPATIENT
Start: 2023-07-15 | End: 2023-07-17 | Stop reason: HOSPADM

## 2023-07-15 RX ORDER — ESCITALOPRAM OXALATE 10 MG/1
20 TABLET ORAL DAILY
Status: DISCONTINUED | OUTPATIENT
Start: 2023-07-15 | End: 2023-07-17 | Stop reason: HOSPADM

## 2023-07-15 RX ORDER — SODIUM CHLORIDE 0.9 % (FLUSH) 0.9 %
5-40 SYRINGE (ML) INJECTION PRN
Status: DISCONTINUED | OUTPATIENT
Start: 2023-07-15 | End: 2023-07-17 | Stop reason: HOSPADM

## 2023-07-15 RX ORDER — LANOLIN ALCOHOL/MO/W.PET/CERES
1000 CREAM (GRAM) TOPICAL DAILY
Status: DISCONTINUED | OUTPATIENT
Start: 2023-07-15 | End: 2023-07-17 | Stop reason: HOSPADM

## 2023-07-15 RX ORDER — ROSUVASTATIN CALCIUM 20 MG/1
20 TABLET, COATED ORAL NIGHTLY
Status: DISCONTINUED | OUTPATIENT
Start: 2023-07-16 | End: 2023-07-17 | Stop reason: HOSPADM

## 2023-07-15 RX ORDER — OXYCODONE HYDROCHLORIDE 5 MG/1
5 TABLET ORAL EVERY 6 HOURS PRN
Status: DISCONTINUED | OUTPATIENT
Start: 2023-07-15 | End: 2023-07-17 | Stop reason: HOSPADM

## 2023-07-15 RX ORDER — POTASSIUM CHLORIDE 7.45 MG/ML
10 INJECTION INTRAVENOUS PRN
Status: DISCONTINUED | OUTPATIENT
Start: 2023-07-15 | End: 2023-07-17 | Stop reason: HOSPADM

## 2023-07-15 RX ORDER — ASCORBIC ACID 500 MG
500 TABLET ORAL DAILY
Status: DISCONTINUED | OUTPATIENT
Start: 2023-07-15 | End: 2023-07-17 | Stop reason: HOSPADM

## 2023-07-15 RX ORDER — MAGNESIUM SULFATE IN WATER 40 MG/ML
2000 INJECTION, SOLUTION INTRAVENOUS PRN
Status: DISCONTINUED | OUTPATIENT
Start: 2023-07-15 | End: 2023-07-17 | Stop reason: HOSPADM

## 2023-07-15 RX ORDER — GUAIFENESIN 200 MG/10ML
200 LIQUID ORAL EVERY 4 HOURS PRN
Status: DISCONTINUED | OUTPATIENT
Start: 2023-07-15 | End: 2023-07-17 | Stop reason: HOSPADM

## 2023-07-15 RX ORDER — DEXTROSE MONOHYDRATE 100 MG/ML
INJECTION, SOLUTION INTRAVENOUS CONTINUOUS PRN
Status: DISCONTINUED | OUTPATIENT
Start: 2023-07-15 | End: 2023-07-17 | Stop reason: HOSPADM

## 2023-07-15 RX ORDER — ENOXAPARIN SODIUM 100 MG/ML
30 INJECTION SUBCUTANEOUS EVERY EVENING
Status: DISCONTINUED | OUTPATIENT
Start: 2023-07-15 | End: 2023-07-17 | Stop reason: HOSPADM

## 2023-07-15 RX ORDER — METOPROLOL SUCCINATE 50 MG/1
50 TABLET, EXTENDED RELEASE ORAL DAILY
Status: DISCONTINUED | OUTPATIENT
Start: 2023-07-15 | End: 2023-07-17 | Stop reason: HOSPADM

## 2023-07-15 RX ORDER — ROSUVASTATIN CALCIUM 20 MG/1
20 TABLET, COATED ORAL DAILY
Status: DISCONTINUED | OUTPATIENT
Start: 2023-07-15 | End: 2023-07-15

## 2023-07-15 RX ORDER — INSULIN LISPRO 100 [IU]/ML
0-4 INJECTION, SOLUTION INTRAVENOUS; SUBCUTANEOUS
Status: DISCONTINUED | OUTPATIENT
Start: 2023-07-15 | End: 2023-07-17 | Stop reason: HOSPADM

## 2023-07-15 RX ADMIN — GABAPENTIN 100 MG: 100 CAPSULE ORAL at 22:36

## 2023-07-15 RX ADMIN — GABAPENTIN 100 MG: 100 CAPSULE ORAL at 13:55

## 2023-07-15 RX ADMIN — ENOXAPARIN SODIUM 30 MG: 100 INJECTION SUBCUTANEOUS at 16:57

## 2023-07-15 RX ADMIN — ACYCLOVIR 400 MG: 200 CAPSULE ORAL at 22:35

## 2023-07-15 RX ADMIN — GABAPENTIN 100 MG: 100 CAPSULE ORAL at 09:14

## 2023-07-15 RX ADMIN — METOPROLOL SUCCINATE 50 MG: 50 TABLET, EXTENDED RELEASE ORAL at 09:18

## 2023-07-15 RX ADMIN — CEFTRIAXONE SODIUM 1000 MG: 1 INJECTION, POWDER, FOR SOLUTION INTRAMUSCULAR; INTRAVENOUS at 09:30

## 2023-07-15 RX ADMIN — OXYCODONE HYDROCHLORIDE AND ACETAMINOPHEN 500 MG: 500 TABLET ORAL at 09:18

## 2023-07-15 RX ADMIN — SODIUM CHLORIDE, POTASSIUM CHLORIDE, SODIUM LACTATE AND CALCIUM CHLORIDE: 600; 310; 30; 20 INJECTION, SOLUTION INTRAVENOUS at 03:52

## 2023-07-15 RX ADMIN — CYANOCOBALAMIN TAB 1000 MCG 1000 MCG: 1000 TAB at 09:21

## 2023-07-15 RX ADMIN — ACETAMINOPHEN 650 MG: 325 TABLET ORAL at 09:14

## 2023-07-15 RX ADMIN — ASPIRIN 81 MG: 81 TABLET, CHEWABLE ORAL at 02:28

## 2023-07-15 RX ADMIN — SODIUM CHLORIDE, POTASSIUM CHLORIDE, SODIUM LACTATE AND CALCIUM CHLORIDE: 600; 310; 30; 20 INJECTION, SOLUTION INTRAVENOUS at 22:58

## 2023-07-15 RX ADMIN — ACETAMINOPHEN 650 MG: 325 TABLET ORAL at 15:23

## 2023-07-15 RX ADMIN — SODIUM CHLORIDE, PRESERVATIVE FREE 10 ML: 5 INJECTION INTRAVENOUS at 03:44

## 2023-07-15 RX ADMIN — SODIUM CHLORIDE: 9 INJECTION, SOLUTION INTRAVENOUS at 09:29

## 2023-07-15 RX ADMIN — BUDESONIDE AND FORMOTEROL FUMARATE DIHYDRATE 2 PUFF: 80; 4.5 AEROSOL RESPIRATORY (INHALATION) at 20:50

## 2023-07-15 RX ADMIN — INSULIN GLARGINE 15 UNITS: 100 INJECTION, SOLUTION SUBCUTANEOUS at 22:35

## 2023-07-15 RX ADMIN — ASPIRIN 81 MG: 81 TABLET, CHEWABLE ORAL at 22:36

## 2023-07-15 RX ADMIN — ACETAMINOPHEN 650 MG: 325 TABLET ORAL at 22:59

## 2023-07-15 RX ADMIN — BUDESONIDE AND FORMOTEROL FUMARATE DIHYDRATE 2 PUFF: 80; 4.5 AEROSOL RESPIRATORY (INHALATION) at 11:40

## 2023-07-15 RX ADMIN — PANTOPRAZOLE SODIUM 40 MG: 40 TABLET, DELAYED RELEASE ORAL at 05:30

## 2023-07-15 RX ADMIN — GABAPENTIN 100 MG: 100 CAPSULE ORAL at 16:57

## 2023-07-15 RX ADMIN — GUAIFENESIN 200 MG: 100 SOLUTION ORAL at 11:30

## 2023-07-15 RX ADMIN — ACETAMINOPHEN 650 MG: 325 TABLET ORAL at 02:27

## 2023-07-15 RX ADMIN — SODIUM CHLORIDE, POTASSIUM CHLORIDE, SODIUM LACTATE AND CALCIUM CHLORIDE: 600; 310; 30; 20 INJECTION, SOLUTION INTRAVENOUS at 14:10

## 2023-07-15 RX ADMIN — MAGNESIUM SULFATE HEPTAHYDRATE 2000 MG: 40 INJECTION, SOLUTION INTRAVENOUS at 03:43

## 2023-07-15 RX ADMIN — ACYCLOVIR 400 MG: 200 CAPSULE ORAL at 09:18

## 2023-07-15 RX ADMIN — SODIUM CHLORIDE, PRESERVATIVE FREE 10 ML: 5 INJECTION INTRAVENOUS at 22:37

## 2023-07-15 RX ADMIN — ESCITALOPRAM OXALATE 20 MG: 10 TABLET ORAL at 09:18

## 2023-07-15 RX ADMIN — SODIUM CHLORIDE, PRESERVATIVE FREE 10 ML: 5 INJECTION INTRAVENOUS at 09:34

## 2023-07-15 RX ADMIN — GUAIFENESIN 200 MG: 100 SOLUTION ORAL at 22:34

## 2023-07-15 ASSESSMENT — ENCOUNTER SYMPTOMS
ABDOMINAL PAIN: 0
CHOKING: 0
NAUSEA: 0
WHEEZING: 0
DIARRHEA: 0
STRIDOR: 0
CHEST TIGHTNESS: 0
BACK PAIN: 0
EYE PAIN: 0
VOMITING: 0
SHORTNESS OF BREATH: 0
CONSTIPATION: 0
COUGH: 1
BLOOD IN STOOL: 0
ABDOMINAL DISTENTION: 0

## 2023-07-15 ASSESSMENT — PAIN SCALES - WONG BAKER
WONGBAKER_NUMERICALRESPONSE: 4
WONGBAKER_NUMERICALRESPONSE: 4

## 2023-07-15 ASSESSMENT — PAIN DESCRIPTION - LOCATION
LOCATION: BACK
LOCATION: ABDOMEN
LOCATION: BACK;HIP

## 2023-07-15 ASSESSMENT — PAIN DESCRIPTION - ORIENTATION
ORIENTATION: LEFT;RIGHT;LOWER
ORIENTATION: LEFT
ORIENTATION: RIGHT;LEFT
ORIENTATION: LOWER

## 2023-07-15 ASSESSMENT — PAIN DESCRIPTION - DESCRIPTORS
DESCRIPTORS: ACHING

## 2023-07-15 ASSESSMENT — PAIN - FUNCTIONAL ASSESSMENT
PAIN_FUNCTIONAL_ASSESSMENT: PREVENTS OR INTERFERES SOME ACTIVE ACTIVITIES AND ADLS
PAIN_FUNCTIONAL_ASSESSMENT: PREVENTS OR INTERFERES SOME ACTIVE ACTIVITIES AND ADLS
PAIN_FUNCTIONAL_ASSESSMENT: ACTIVITIES ARE NOT PREVENTED

## 2023-07-15 ASSESSMENT — PAIN SCALES - GENERAL
PAINLEVEL_OUTOF10: 4
PAINLEVEL_OUTOF10: 5
PAINLEVEL_OUTOF10: 8
PAINLEVEL_OUTOF10: 5
PAINLEVEL_OUTOF10: 6
PAINLEVEL_OUTOF10: 4

## 2023-07-15 NOTE — ED PROVIDER NOTES
Nick Mead was checked out to me by Dr. Lilli Parker. Please see his/her initial documentation for details of the patient's ED presentation, physical exam and completed studies. In brief, Nick Mead is a 72 y.o. female that presents with increased weakness, fevers, upper respiratory tract infection symptoms, and frequent falls in the setting of recent nephrectomy last week. Patient did test positive for COVID today.     Labs  Results for orders placed or performed during the hospital encounter of 07/14/23   CBC with Auto Differential   Result Value Ref Range    WBC 5.1 4.0 - 10.5 K/CU MM    RBC 3.65 (L) 4.2 - 5.4 M/CU MM    Hemoglobin 10.8 (L) 12.5 - 16.0 GM/DL    Hematocrit 36.7 (L) 37 - 47 %    .5 (H) 78 - 100 FL    MCH 29.6 27 - 31 PG    MCHC 29.4 (L) 32.0 - 36.0 %    RDW 15.4 (H) 11.7 - 14.9 %    Platelets 091 345 - 919 K/CU MM    MPV 10.1 7.5 - 11.1 FL    Myelocyte Percent 2 (H) 0.0 %    Metamyelocytes Relative 3 (H) 0.0 %    Bands Relative 7 5 - 11 %    Segs Relative 62.0 36 - 66 %    Eosinophils % 12.0 (H) 0 - 3 %    Basophils % 1.0 0 - 1 %    Lymphocytes % 6.0 (L) 24 - 44 %    Monocytes % 7.0 (H) 0 - 4 %    Myelocytes Absolute 0.10 K/CU MM    Metamyelocytes Absolute 0.15 K/CU MM    Bands Absolute 0.36 K/CU MM    Segs Absolute 3.1 K/CU MM    Eosinophils Absolute 0.6 K/CU MM    Basophils Absolute 0.1 K/CU MM    Lymphocytes Absolute 0.3 K/CU MM    Monocytes Absolute 0.4 K/CU MM    Differential Type MANUAL DIFFERENTIAL    Comprehensive Metabolic Panel   Result Value Ref Range    Sodium 138 135 - 145 MMOL/L    Potassium 3.9 3.5 - 5.1 MMOL/L    Chloride 98 (L) 99 - 110 mMol/L    CO2 24 21 - 32 MMOL/L    BUN 30 (H) 6 - 23 MG/DL    Creatinine 2.6 (H) 0.6 - 1.1 MG/DL    Est, Glom Filt Rate 19 (L) >60 mL/min/1.73m2    Glucose 143 (H) 70 - 99 MG/DL    Calcium 9.6 8.3 - 10.6 MG/DL    Albumin 4.4 3.4 - 5.0 GM/DL    Total Protein 7.5 6.4 - 8.2 GM/DL    Total Bilirubin 0.4 0.0 - 1.0 MG/DL    ALT 11 10
ischemia  Prolonged QT  Abnormal ECG  No previous ECGs available         Radiographs (if obtained): The following radiograph was interpreted by myself in the absence of a radiologist:  x radiologist's Report reviewed at time of ED visit:  XR CHEST PORTABLE    Result Date: 7/14/2023  EXAMINATION: ONE XRAY VIEW OF THE CHEST 7/14/2023 7:56 pm COMPARISON: None. HISTORY: COVID-19 positive. FINDINGS: Patient is slightly rotated. Normal cardiomediastinal silhouette. Sternotomy changes. No focal airspace disease. No significant pleural effusion. No pneumothorax. Osteopenia. Negative chest radiograph. CC/HPI Summary, DDx, ED Course, and Reassessment:   Patient reports that she was positive for COVID-19 at home. On my assessment, she is without hypoxia or respiratory distress. Chest x-ray is clear without infiltrates. Labs obtained given patient's significant weakness. Labs are notable for acute renal injury with severe hypomagnesemia. Have initiated IV fluids as well as intravenous replacement. No change in baseline anemia. No significant leukocytosis although she does have left shift with immature white cells. Urinalysis ordered and pending. Patient has reassuring abdominal exam.  Given her head injury and recent fall, she sent for CT head and cervical spine. Patient was given the following medications:  Medications   magnesium sulfate 2000 mg in 50 mL IVPB premix (2,000 mg IntraVENous New Bag 7/14/23 2058)   0.9 % sodium chloride bolus (has no administration in time range)   0.9 % sodium chloride bolus (1,000 mLs IntraVENous New Bag 7/14/23 2111)       EKG (if obtained): (All EKG's are interpreted by myself in the absence of a cardiologist) normal sinus rhythm at 74. Left axis with good R wave progression. Nonspecific T wave changes without ST elevation. Corrected QT is 492 ms. No prior EKG available for comparison.     Chronic conditions affecting care: Multiple myeloma, HTN,

## 2023-07-16 LAB
25(OH)D3 SERPL-MCNC: 29.76 NG/ML
ANION GAP SERPL CALCULATED.3IONS-SCNC: 15 MMOL/L (ref 4–16)
BASOPHILS ABSOLUTE: 0.1 K/CU MM
BASOPHILS RELATIVE PERCENT: 1.5 % (ref 0–1)
BUN SERPL-MCNC: 25 MG/DL (ref 6–23)
CALCIUM SERPL-MCNC: 8.5 MG/DL (ref 8.3–10.6)
CHLORIDE BLD-SCNC: 104 MMOL/L (ref 99–110)
CO2: 20 MMOL/L (ref 21–32)
CREAT SERPL-MCNC: 2.5 MG/DL (ref 0.6–1.1)
CREATININE URINE: 75.6 MG/DL (ref 28–217)
DIFFERENTIAL TYPE: ABNORMAL
EOSINOPHILS ABSOLUTE: 0.3 K/CU MM
EOSINOPHILS RELATIVE PERCENT: 9.9 % (ref 0–3)
GFR SERPL CREATININE-BSD FRML MDRD: 20 ML/MIN/1.73M2
GLUCOSE BLD-MCNC: 124 MG/DL (ref 70–99)
GLUCOSE BLD-MCNC: 129 MG/DL (ref 70–99)
GLUCOSE BLD-MCNC: 145 MG/DL (ref 70–99)
GLUCOSE BLD-MCNC: 183 MG/DL (ref 70–99)
GLUCOSE SERPL-MCNC: 112 MG/DL (ref 70–99)
HCT VFR BLD CALC: 32 % (ref 37–47)
HEMOGLOBIN: 9.6 GM/DL (ref 12.5–16)
IMMATURE NEUTROPHIL %: 0.9 % (ref 0–0.43)
LYMPHOCYTES ABSOLUTE: 0.3 K/CU MM
LYMPHOCYTES RELATIVE PERCENT: 8.7 % (ref 24–44)
MAGNESIUM: 1.6 MG/DL (ref 1.8–2.4)
MCH RBC QN AUTO: 30.5 PG (ref 27–31)
MCHC RBC AUTO-ENTMCNC: 30 % (ref 32–36)
MCV RBC AUTO: 101.6 FL (ref 78–100)
MONOCYTES ABSOLUTE: 0.5 K/CU MM
MONOCYTES RELATIVE PERCENT: 13.4 % (ref 0–4)
NUCLEATED RBC %: 0 %
PDW BLD-RTO: 15.6 % (ref 11.7–14.9)
PHOSPHORUS: 4 MG/DL (ref 2.5–4.9)
PLATELET # BLD: 121 K/CU MM (ref 140–440)
PMV BLD AUTO: 10.1 FL (ref 7.5–11.1)
POTASSIUM SERPL-SCNC: 4.1 MMOL/L (ref 3.5–5.1)
PROCALCITONIN SERPL-MCNC: 0.18 NG/ML
PROT/CREAT RATIO, UR: 0.6
RBC # BLD: 3.15 M/CU MM (ref 4.2–5.4)
SEGMENTED NEUTROPHILS ABSOLUTE COUNT: 2.2 K/CU MM
SEGMENTED NEUTROPHILS RELATIVE PERCENT: 65.6 % (ref 36–66)
SODIUM BLD-SCNC: 139 MMOL/L (ref 135–145)
T4 FREE SERPL-MCNC: 0.99 NG/DL (ref 0.9–1.8)
TOTAL IMMATURE NEUTOROPHIL: 0.03 K/CU MM
TOTAL NUCLEATED RBC: 0 K/CU MM
URINE TOTAL PROTEIN: 46.5 MG/DL
WBC # BLD: 3.4 K/CU MM (ref 4–10.5)

## 2023-07-16 PROCEDURE — 87070 CULTURE OTHR SPECIMN AEROBIC: CPT

## 2023-07-16 PROCEDURE — 87205 SMEAR GRAM STAIN: CPT

## 2023-07-16 PROCEDURE — 2580000003 HC RX 258: Performed by: STUDENT IN AN ORGANIZED HEALTH CARE EDUCATION/TRAINING PROGRAM

## 2023-07-16 PROCEDURE — 2580000003 HC RX 258: Performed by: INTERNAL MEDICINE

## 2023-07-16 PROCEDURE — 82306 VITAMIN D 25 HYDROXY: CPT

## 2023-07-16 PROCEDURE — 94761 N-INVAS EAR/PLS OXIMETRY MLT: CPT

## 2023-07-16 PROCEDURE — 83735 ASSAY OF MAGNESIUM: CPT

## 2023-07-16 PROCEDURE — 94640 AIRWAY INHALATION TREATMENT: CPT

## 2023-07-16 PROCEDURE — 84145 PROCALCITONIN (PCT): CPT

## 2023-07-16 PROCEDURE — 94664 DEMO&/EVAL PT USE INHALER: CPT

## 2023-07-16 PROCEDURE — 6360000002 HC RX W HCPCS: Performed by: INTERNAL MEDICINE

## 2023-07-16 PROCEDURE — 6360000002 HC RX W HCPCS: Performed by: STUDENT IN AN ORGANIZED HEALTH CARE EDUCATION/TRAINING PROGRAM

## 2023-07-16 PROCEDURE — 2500000003 HC RX 250 WO HCPCS: Performed by: INTERNAL MEDICINE

## 2023-07-16 PROCEDURE — 82962 GLUCOSE BLOOD TEST: CPT

## 2023-07-16 PROCEDURE — 6370000000 HC RX 637 (ALT 250 FOR IP): Performed by: INTERNAL MEDICINE

## 2023-07-16 PROCEDURE — 84156 ASSAY OF PROTEIN URINE: CPT

## 2023-07-16 PROCEDURE — 6370000000 HC RX 637 (ALT 250 FOR IP): Performed by: FAMILY MEDICINE

## 2023-07-16 PROCEDURE — 84439 ASSAY OF FREE THYROXINE: CPT

## 2023-07-16 PROCEDURE — 1200000000 HC SEMI PRIVATE

## 2023-07-16 PROCEDURE — 85025 COMPLETE CBC W/AUTO DIFF WBC: CPT

## 2023-07-16 PROCEDURE — 84100 ASSAY OF PHOSPHORUS: CPT

## 2023-07-16 PROCEDURE — 6370000000 HC RX 637 (ALT 250 FOR IP): Performed by: STUDENT IN AN ORGANIZED HEALTH CARE EDUCATION/TRAINING PROGRAM

## 2023-07-16 PROCEDURE — 36415 COLL VENOUS BLD VENIPUNCTURE: CPT

## 2023-07-16 PROCEDURE — 80048 BASIC METABOLIC PNL TOTAL CA: CPT

## 2023-07-16 PROCEDURE — 82570 ASSAY OF URINE CREATININE: CPT

## 2023-07-16 RX ORDER — MAGNESIUM SULFATE IN WATER 40 MG/ML
2000 INJECTION, SOLUTION INTRAVENOUS ONCE
Status: COMPLETED | OUTPATIENT
Start: 2023-07-16 | End: 2023-07-16

## 2023-07-16 RX ORDER — CHOLECALCIFEROL (VITAMIN D3) 125 MCG
10 CAPSULE ORAL NIGHTLY PRN
Status: DISCONTINUED | OUTPATIENT
Start: 2023-07-17 | End: 2023-07-17

## 2023-07-16 RX ADMIN — ACETAMINOPHEN 650 MG: 325 TABLET ORAL at 14:02

## 2023-07-16 RX ADMIN — ACETAMINOPHEN 650 MG: 325 TABLET ORAL at 03:48

## 2023-07-16 RX ADMIN — ROSUVASTATIN CALCIUM 20 MG: 20 TABLET, FILM COATED ORAL at 22:35

## 2023-07-16 RX ADMIN — ACYCLOVIR 400 MG: 200 CAPSULE ORAL at 09:21

## 2023-07-16 RX ADMIN — ACYCLOVIR 400 MG: 200 CAPSULE ORAL at 22:35

## 2023-07-16 RX ADMIN — OXYCODONE HYDROCHLORIDE AND ACETAMINOPHEN 500 MG: 500 TABLET ORAL at 09:22

## 2023-07-16 RX ADMIN — SODIUM CHLORIDE, PRESERVATIVE FREE 10 ML: 5 INJECTION INTRAVENOUS at 22:36

## 2023-07-16 RX ADMIN — PANTOPRAZOLE SODIUM 40 MG: 40 TABLET, DELAYED RELEASE ORAL at 03:51

## 2023-07-16 RX ADMIN — CEFTRIAXONE SODIUM 1000 MG: 1 INJECTION, POWDER, FOR SOLUTION INTRAMUSCULAR; INTRAVENOUS at 09:21

## 2023-07-16 RX ADMIN — ESCITALOPRAM OXALATE 20 MG: 10 TABLET ORAL at 09:21

## 2023-07-16 RX ADMIN — SODIUM CHLORIDE: 9 INJECTION, SOLUTION INTRAVENOUS at 09:20

## 2023-07-16 RX ADMIN — METOPROLOL SUCCINATE 50 MG: 50 TABLET, EXTENDED RELEASE ORAL at 09:22

## 2023-07-16 RX ADMIN — MAGNESIUM SULFATE HEPTAHYDRATE 2000 MG: 40 INJECTION, SOLUTION INTRAVENOUS at 10:27

## 2023-07-16 RX ADMIN — BUDESONIDE AND FORMOTEROL FUMARATE DIHYDRATE 2 PUFF: 80; 4.5 AEROSOL RESPIRATORY (INHALATION) at 07:37

## 2023-07-16 RX ADMIN — ACETAMINOPHEN 650 MG: 325 TABLET ORAL at 22:34

## 2023-07-16 RX ADMIN — LEVOTHYROXINE SODIUM 88 MCG: 0.09 TABLET ORAL at 03:47

## 2023-07-16 RX ADMIN — GABAPENTIN 100 MG: 100 CAPSULE ORAL at 14:02

## 2023-07-16 RX ADMIN — INSULIN GLARGINE 15 UNITS: 100 INJECTION, SOLUTION SUBCUTANEOUS at 22:37

## 2023-07-16 RX ADMIN — GABAPENTIN 100 MG: 100 CAPSULE ORAL at 16:58

## 2023-07-16 RX ADMIN — CYANOCOBALAMIN TAB 1000 MCG 1000 MCG: 1000 TAB at 09:21

## 2023-07-16 RX ADMIN — GABAPENTIN 100 MG: 100 CAPSULE ORAL at 09:21

## 2023-07-16 RX ADMIN — GUAIFENESIN 200 MG: 100 SOLUTION ORAL at 22:35

## 2023-07-16 RX ADMIN — TOPICAL ANESTHETIC: 200 SPRAY DENTAL; PERIODONTAL at 22:36

## 2023-07-16 RX ADMIN — ENOXAPARIN SODIUM 30 MG: 100 INJECTION SUBCUTANEOUS at 16:57

## 2023-07-16 RX ADMIN — GABAPENTIN 100 MG: 100 CAPSULE ORAL at 22:35

## 2023-07-16 ASSESSMENT — ENCOUNTER SYMPTOMS
VOMITING: 0
CHEST TIGHTNESS: 0
ABDOMINAL PAIN: 0
CHOKING: 0
BLOOD IN STOOL: 0
ABDOMINAL DISTENTION: 0
COUGH: 1
STRIDOR: 0
SHORTNESS OF BREATH: 0
EYE PAIN: 0
CONSTIPATION: 0
DIARRHEA: 0
NAUSEA: 0
BACK PAIN: 0
WHEEZING: 0

## 2023-07-16 ASSESSMENT — PAIN - FUNCTIONAL ASSESSMENT: PAIN_FUNCTIONAL_ASSESSMENT: PREVENTS OR INTERFERES SOME ACTIVE ACTIVITIES AND ADLS

## 2023-07-16 ASSESSMENT — PAIN DESCRIPTION - ORIENTATION
ORIENTATION: RIGHT;LEFT
ORIENTATION: LEFT;LOWER

## 2023-07-16 ASSESSMENT — PAIN DESCRIPTION - DESCRIPTORS
DESCRIPTORS: ACHING;SORE
DESCRIPTORS: ACHING

## 2023-07-16 ASSESSMENT — PAIN DESCRIPTION - LOCATION: LOCATION: BACK;SHOULDER

## 2023-07-16 ASSESSMENT — PAIN SCALES - GENERAL
PAINLEVEL_OUTOF10: 3
PAINLEVEL_OUTOF10: 3
PAINLEVEL_OUTOF10: 0

## 2023-07-16 ASSESSMENT — PAIN SCALES - WONG BAKER: WONGBAKER_NUMERICALRESPONSE: 0

## 2023-07-16 NOTE — CONSULTS
[address], [address] [place] [address], [address] [place] [address]  Phone: [phone]  Office Hours: 8:30AM - 4:30PM  Monday - Friday     Nephrology Service Consultation    Patient:  Theo Candelario  MRN: [id number]  Consulting physician:  Terrence Berman MD  Reason for Consult: SANDRA    History Obtained From:  patient, spouse, electronic medical record  PCP: Melissa Paniagua MD    HISTORY OF PRESENT ILLNESS:   The patient is a 72 y.o. female who presents with fever- found to have covid on 7/14  She had RCC and has undergone Nephrectomy by Dr Bryanna Lang. -[facility]-  She also ha s multiple myeloma on therapy at [facility]. She has not been eating or drinking as well following Nephrectomy and now with fever. Making urine  No hematuria  No recent contrast -since nephrectomy  Last creat I could find was from 2022 - 1.3-1.2  Not soa  No recent NSAIDs   Arville Dubin in the room when I evaluated her.       Past Medical History:        Diagnosis Date    Diabetes mellitus ([address])     Hyperlipidemia     Multiple myeloma (HCC)        Past Surgical History:        Procedure Laterality Date    KIDNEY REMOVAL Right        Medications:   Scheduled Meds:   magnesium sulfate  2,000 mg IntraVENous Once    aspirin  81 mg Oral Daily    escitalopram  20 mg Oral Daily    budesonide-formoterol  2 puff Inhalation BID    gabapentin  100 mg Oral 4x Daily    levothyroxine  88 mcg Oral Daily    metoprolol succinate  50 mg Oral Daily    pantoprazole  40 mg Oral Daily    sodium chloride flush  5-40 mL IntraVENous 2 times per day    enoxaparin  30 mg SubCUTAneous QPM    acyclovir  400 mg Oral BID    ascorbic acid  500 mg Oral Daily    insulin glargine  15 Units SubCUTAneous Nightly    insulin lispro  0-4 Units SubCUTAneous TID     insulin lispro  0-4 Units SubCUTAneous Nightly    cyanocobalamin  1,000 mcg Oral Daily    cefTRIAXone (ROCEPHIN) IV  1,000 mg IntraVENous Q24H    rosuvastatin  20 mg Oral Nightly     Continuous Infusions:   sodium chloride 25 mL/hr at

## 2023-07-17 VITALS
SYSTOLIC BLOOD PRESSURE: 153 MMHG | RESPIRATION RATE: 21 BRPM | HEIGHT: 62 IN | BODY MASS INDEX: 42.88 KG/M2 | HEART RATE: 64 BPM | WEIGHT: 233 LBS | OXYGEN SATURATION: 99 % | TEMPERATURE: 98.7 F | DIASTOLIC BLOOD PRESSURE: 84 MMHG

## 2023-07-17 LAB
ANION GAP SERPL CALCULATED.3IONS-SCNC: 15 MMOL/L (ref 4–16)
BASOPHILS ABSOLUTE: 0.1 K/CU MM
BASOPHILS RELATIVE PERCENT: 1.9 % (ref 0–1)
BUN SERPL-MCNC: 26 MG/DL (ref 6–23)
CALCIUM SERPL-MCNC: 8.8 MG/DL (ref 8.3–10.6)
CHLORIDE BLD-SCNC: 104 MMOL/L (ref 99–110)
CO2: 17 MMOL/L (ref 21–32)
CREAT SERPL-MCNC: 2.4 MG/DL (ref 0.6–1.1)
DIFFERENTIAL TYPE: ABNORMAL
EKG ATRIAL RATE: 74 BPM
EKG DIAGNOSIS: NORMAL
EKG P AXIS: 86 DEGREES
EKG P-R INTERVAL: 144 MS
EKG Q-T INTERVAL: 444 MS
EKG QRS DURATION: 122 MS
EKG QTC CALCULATION (BAZETT): 492 MS
EKG R AXIS: -30 DEGREES
EKG T AXIS: 86 DEGREES
EKG VENTRICULAR RATE: 74 BPM
EOSINOPHILS ABSOLUTE: 0.5 K/CU MM
EOSINOPHILS RELATIVE PERCENT: 16 % (ref 0–3)
GFR SERPL CREATININE-BSD FRML MDRD: 21 ML/MIN/1.73M2
GLUCOSE BLD-MCNC: 128 MG/DL (ref 70–99)
GLUCOSE BLD-MCNC: 130 MG/DL (ref 70–99)
GLUCOSE BLD-MCNC: 156 MG/DL (ref 70–99)
GLUCOSE SERPL-MCNC: 133 MG/DL (ref 70–99)
HCT VFR BLD CALC: 33.5 % (ref 37–47)
HEMOGLOBIN: 10.1 GM/DL (ref 12.5–16)
IMMATURE NEUTROPHIL %: 0.6 % (ref 0–0.43)
LYMPHOCYTES ABSOLUTE: 0.3 K/CU MM
LYMPHOCYTES RELATIVE PERCENT: 8 % (ref 24–44)
MAGNESIUM: 2 MG/DL (ref 1.8–2.4)
MCH RBC QN AUTO: 30.3 PG (ref 27–31)
MCHC RBC AUTO-ENTMCNC: 30.1 % (ref 32–36)
MCV RBC AUTO: 100.6 FL (ref 78–100)
MONOCYTES ABSOLUTE: 0.5 K/CU MM
MONOCYTES RELATIVE PERCENT: 15.3 % (ref 0–4)
NUCLEATED RBC %: 0 %
PDW BLD-RTO: 15.5 % (ref 11.7–14.9)
PHOSPHORUS: 3.9 MG/DL (ref 2.5–4.9)
PLATELET # BLD: 137 K/CU MM (ref 140–440)
PMV BLD AUTO: 9.8 FL (ref 7.5–11.1)
POTASSIUM SERPL-SCNC: 4.2 MMOL/L (ref 3.5–5.1)
RBC # BLD: 3.33 M/CU MM (ref 4.2–5.4)
SEGMENTED NEUTROPHILS ABSOLUTE COUNT: 1.8 K/CU MM
SEGMENTED NEUTROPHILS RELATIVE PERCENT: 58.2 % (ref 36–66)
SODIUM BLD-SCNC: 136 MMOL/L (ref 135–145)
TOTAL IMMATURE NEUTOROPHIL: 0.02 K/CU MM
TOTAL NUCLEATED RBC: 0 K/CU MM
WBC # BLD: 3.1 K/CU MM (ref 4–10.5)

## 2023-07-17 PROCEDURE — 94761 N-INVAS EAR/PLS OXIMETRY MLT: CPT

## 2023-07-17 PROCEDURE — 93010 ELECTROCARDIOGRAM REPORT: CPT | Performed by: INTERNAL MEDICINE

## 2023-07-17 PROCEDURE — 6360000002 HC RX W HCPCS: Performed by: INTERNAL MEDICINE

## 2023-07-17 PROCEDURE — 83735 ASSAY OF MAGNESIUM: CPT

## 2023-07-17 PROCEDURE — 2580000003 HC RX 258: Performed by: INTERNAL MEDICINE

## 2023-07-17 PROCEDURE — 80048 BASIC METABOLIC PNL TOTAL CA: CPT

## 2023-07-17 PROCEDURE — 82962 GLUCOSE BLOOD TEST: CPT

## 2023-07-17 PROCEDURE — 6370000000 HC RX 637 (ALT 250 FOR IP): Performed by: STUDENT IN AN ORGANIZED HEALTH CARE EDUCATION/TRAINING PROGRAM

## 2023-07-17 PROCEDURE — 36415 COLL VENOUS BLD VENIPUNCTURE: CPT

## 2023-07-17 PROCEDURE — 87040 BLOOD CULTURE FOR BACTERIA: CPT

## 2023-07-17 PROCEDURE — 85025 COMPLETE CBC W/AUTO DIFF WBC: CPT

## 2023-07-17 PROCEDURE — 84100 ASSAY OF PHOSPHORUS: CPT

## 2023-07-17 PROCEDURE — 6370000000 HC RX 637 (ALT 250 FOR IP): Performed by: FAMILY MEDICINE

## 2023-07-17 PROCEDURE — 94640 AIRWAY INHALATION TREATMENT: CPT

## 2023-07-17 PROCEDURE — 05HF33Z INSERTION OF INFUSION DEVICE INTO LEFT CEPHALIC VEIN, PERCUTANEOUS APPROACH: ICD-10-PCS | Performed by: INTERNAL MEDICINE

## 2023-07-17 PROCEDURE — 6360000002 HC RX W HCPCS: Performed by: STUDENT IN AN ORGANIZED HEALTH CARE EDUCATION/TRAINING PROGRAM

## 2023-07-17 PROCEDURE — 76937 US GUIDE VASCULAR ACCESS: CPT

## 2023-07-17 PROCEDURE — 36410 VNPNXR 3YR/> PHY/QHP DX/THER: CPT

## 2023-07-17 PROCEDURE — 2580000003 HC RX 258: Performed by: STUDENT IN AN ORGANIZED HEALTH CARE EDUCATION/TRAINING PROGRAM

## 2023-07-17 RX ORDER — GABAPENTIN 100 MG/1
100 CAPSULE ORAL 4 TIMES DAILY
Qty: 120 CAPSULE | Refills: 0 | Status: SHIPPED | OUTPATIENT
Start: 2023-07-17

## 2023-07-17 RX ORDER — SODIUM CHLORIDE 9 MG/ML
INJECTION, SOLUTION INTRAVENOUS PRN
Status: DISCONTINUED | OUTPATIENT
Start: 2023-07-17 | End: 2023-07-17 | Stop reason: HOSPADM

## 2023-07-17 RX ORDER — GUAIFENESIN 200 MG/10ML
200 LIQUID ORAL EVERY 4 HOURS PRN
Qty: 236 ML | Refills: 0 | Status: SHIPPED | OUTPATIENT
Start: 2023-07-17

## 2023-07-17 RX ORDER — SODIUM CHLORIDE 0.9 % (FLUSH) 0.9 %
5-40 SYRINGE (ML) INJECTION PRN
Status: DISCONTINUED | OUTPATIENT
Start: 2023-07-17 | End: 2023-07-17 | Stop reason: HOSPADM

## 2023-07-17 RX ORDER — LIDOCAINE HYDROCHLORIDE 10 MG/ML
5 INJECTION, SOLUTION EPIDURAL; INFILTRATION; INTRACAUDAL; PERINEURAL ONCE
Status: DISCONTINUED | OUTPATIENT
Start: 2023-07-17 | End: 2023-07-17 | Stop reason: HOSPADM

## 2023-07-17 RX ORDER — DIPHENHYDRAMINE HCL 25 MG
25 TABLET ORAL NIGHTLY PRN
Status: DISCONTINUED | OUTPATIENT
Start: 2023-07-17 | End: 2023-07-17 | Stop reason: HOSPADM

## 2023-07-17 RX ORDER — SODIUM CHLORIDE 0.9 % (FLUSH) 0.9 %
5-40 SYRINGE (ML) INJECTION EVERY 12 HOURS SCHEDULED
Status: DISCONTINUED | OUTPATIENT
Start: 2023-07-17 | End: 2023-07-17 | Stop reason: HOSPADM

## 2023-07-17 RX ORDER — CETIRIZINE HYDROCHLORIDE 10 MG/1
10 TABLET ORAL DAILY PRN
Status: DISCONTINUED | OUTPATIENT
Start: 2023-07-17 | End: 2023-07-17 | Stop reason: HOSPADM

## 2023-07-17 RX ADMIN — GABAPENTIN 100 MG: 100 CAPSULE ORAL at 17:58

## 2023-07-17 RX ADMIN — ASPIRIN 81 MG: 81 TABLET, CHEWABLE ORAL at 00:28

## 2023-07-17 RX ADMIN — METOPROLOL SUCCINATE 50 MG: 50 TABLET, EXTENDED RELEASE ORAL at 12:24

## 2023-07-17 RX ADMIN — CYANOCOBALAMIN TAB 1000 MCG 1000 MCG: 1000 TAB at 12:24

## 2023-07-17 RX ADMIN — ACYCLOVIR 400 MG: 200 CAPSULE ORAL at 12:24

## 2023-07-17 RX ADMIN — SODIUM CHLORIDE: 9 INJECTION, SOLUTION INTRAVENOUS at 12:16

## 2023-07-17 RX ADMIN — ACETAMINOPHEN 650 MG: 325 TABLET ORAL at 12:24

## 2023-07-17 RX ADMIN — CETIRIZINE HYDROCHLORIDE 10 MG: 10 TABLET, FILM COATED ORAL at 00:28

## 2023-07-17 RX ADMIN — ESCITALOPRAM OXALATE 20 MG: 10 TABLET ORAL at 12:39

## 2023-07-17 RX ADMIN — BUDESONIDE AND FORMOTEROL FUMARATE DIHYDRATE 2 PUFF: 80; 4.5 AEROSOL RESPIRATORY (INHALATION) at 00:07

## 2023-07-17 RX ADMIN — SODIUM CHLORIDE, PRESERVATIVE FREE 10 ML: 5 INJECTION INTRAVENOUS at 12:29

## 2023-07-17 RX ADMIN — SODIUM CHLORIDE, PRESERVATIVE FREE 10 ML: 5 INJECTION INTRAVENOUS at 13:39

## 2023-07-17 RX ADMIN — SODIUM CHLORIDE: 9 INJECTION, SOLUTION INTRAVENOUS at 18:07

## 2023-07-17 RX ADMIN — LEVOTHYROXINE SODIUM 88 MCG: 0.09 TABLET ORAL at 03:37

## 2023-07-17 RX ADMIN — PANTOPRAZOLE SODIUM 40 MG: 40 TABLET, DELAYED RELEASE ORAL at 06:33

## 2023-07-17 RX ADMIN — GABAPENTIN 100 MG: 100 CAPSULE ORAL at 12:24

## 2023-07-17 RX ADMIN — DIPHENHYDRAMINE HYDROCHLORIDE 25 MG: 25 TABLET ORAL at 00:28

## 2023-07-17 RX ADMIN — OXYCODONE HYDROCHLORIDE AND ACETAMINOPHEN 500 MG: 500 TABLET ORAL at 12:24

## 2023-07-17 RX ADMIN — BUDESONIDE AND FORMOTEROL FUMARATE DIHYDRATE 2 PUFF: 80; 4.5 AEROSOL RESPIRATORY (INHALATION) at 07:59

## 2023-07-17 RX ADMIN — MEROPENEM 1000 MG: 1 INJECTION, POWDER, FOR SOLUTION INTRAVENOUS at 12:17

## 2023-07-17 RX ADMIN — ENOXAPARIN SODIUM 30 MG: 100 INJECTION SUBCUTANEOUS at 17:59

## 2023-07-17 RX ADMIN — MEROPENEM 500 MG: 500 INJECTION, POWDER, FOR SOLUTION INTRAVENOUS at 18:08

## 2023-07-17 ASSESSMENT — PAIN DESCRIPTION - ORIENTATION: ORIENTATION: RIGHT;LEFT

## 2023-07-17 ASSESSMENT — PAIN SCALES - GENERAL
PAINLEVEL_OUTOF10: 2
PAINLEVEL_OUTOF10: 3

## 2023-07-17 ASSESSMENT — PAIN DESCRIPTION - PAIN TYPE: TYPE: CHRONIC PAIN

## 2023-07-17 ASSESSMENT — PAIN SCALES - WONG BAKER
WONGBAKER_NUMERICALRESPONSE: 2
WONGBAKER_NUMERICALRESPONSE: 2

## 2023-07-17 ASSESSMENT — PAIN DESCRIPTION - LOCATION: LOCATION: BACK;SHOULDER

## 2023-07-17 ASSESSMENT — PAIN DESCRIPTION - DESCRIPTORS: DESCRIPTORS: ACHING;SORE

## 2023-07-17 ASSESSMENT — PAIN - FUNCTIONAL ASSESSMENT: PAIN_FUNCTIONAL_ASSESSMENT: PREVENTS OR INTERFERES SOME ACTIVE ACTIVITIES AND ADLS

## 2023-07-17 NOTE — DISCHARGE INSTRUCTIONS
Internal Medicine Discharge Instruction    Discharge to:  Home  Diet: Low sodium, diabetic diet  Activity: As tolerated       Be compliant with medications  Please take medications as prescribed   Please get blood work done in a few days  Please cut down your gabapentin dose to 100mg 4 times a day - it was reduced due to your kidney function   Please make sure you take your IV antibiotics daily - plan for a total of 14 days.          Electronically signed by Sonia Shane MD on 7/17/2023 at 11:41 AM

## 2023-07-17 NOTE — CONSULTS
Consult completed. Indication for line: OutPt non-vesicant Abx infusions x13d - MidLine will meet pt's therapeutic needs while minimizing risks. Procedure/rationale explained to pt & consent obtained. #20ga AccuCath ACE Intravascular MidLine Catheter initiated to LUE Cephalic Vein using sterile, UltraSound-guided technique without difficulty/complications. Positioning verified via UltraSound visualization of catheter within vessel lumen; site returns blood briskly and flushes without resistance/abnormalities. Sterile dressing with SkinPrep, StatLock Securing Device, CHG gel DSSG, SwabCap, and Limb Precautions band applied. Pt tolerated well & no other c/o or needs noted or reported.

## 2023-07-17 NOTE — CARE COORDINATION
07/17/23 1013   Service Assessment   Patient Orientation Alert and Oriented;Person;Place;Situation   Cognition Alert   History Provided By Patient   Primary Caregiver Self   Support Systems Spouse/Significant Other   Patient's Healthcare Decision Maker is: Legal Next of Kin   PCP Verified by CM Yes   Last Visit to PCP Within last 6 months   Prior Functional Level Independent in ADLs/IADLs   Current Functional Level Independent in ADLs/IADLs   Can patient return to prior living arrangement Yes   Ability to make needs known: Good   Family able to assist with home care needs: Yes     CM called room and spoke with  pt to initiate a safe discharge plan. Cm introduced self and explained role of CM. Pt is kind, alert and oriented. Pt lives with her spouse in a one floor home. Pt has a walker, W/c and cane. Pts  very supportive and will be primary care giver. [address] to follow for home care and IV antibiotics per option care. Ciara with [address] and following with the IV's. Pt declined any other needs at this time. Discharge plan is for pt to return home with spouse. Pt has transportation. PCP/ insurance. [facility] when discharged. CM team following.   CM provided card and encouraged to call for any needs or concern. CM is available if any needs arise.

## 2023-07-17 NOTE — CARE COORDINATION
[facility] Liaison spoke w/pt & she is agreeable w/ IV $42/day co-pay. Aware of discharge & will initiate [address] [facility].

## 2023-07-17 NOTE — CARE COORDINATION
Spoke with Dr Annabella Chavis and he stated hhc can start tomorrow appt as the pt is supposed to get two doses today prior to dc. Verified address, pcp & number. Zacarias Fall from RUDI Baig's office gave ok for c and referral sent to [facility].

## 2023-07-18 LAB
CULTURE: ABNORMAL
CULTURE: ABNORMAL
CULTURE: NORMAL
Lab: ABNORMAL
Lab: NORMAL
SPECIMEN: ABNORMAL
SPECIMEN: NORMAL

## 2023-07-19 NOTE — PROGRESS NOTES
4 Eyes Skin Assessment     NAME:  Marianne Leo  YOB: 1950  MEDICAL RECORD NUMBER:  [id number]    The patient is being assessed for  Admission    I agree that at least one RN has performed a thorough Head to Toe Skin Assessment on the patient. ALL assessment sites listed below have been assessed. Areas assessed by both nurses:    Head, Face, Ears, Shoulders, Back, Chest, Arms, Elbows, Hands, Sacrum. Buttock, Coccyx, Ischium, Legs. Feet and Heels, and Under Medical Devices . Previous  surgical scars on abdomen and bruising on left inner arm. Does the Patient have a Wound?  No noted wound(s)       Burke Prevention initiated by RN: Yes  Wound Care Orders initiated by RN: No    Pressure Injury (Stage 3,4, Unstageable, DTI, NWPT, and Complex wounds) if present, place Wound referral order by RN under : No    New Ostomies, if present place, Ostomy referral order under : No     Nurse 1 eSignature: Electronically signed by Royal Barrera LPN on 0/48/19 at 2:97 AM EDT    **SHARE this note so that the co-signing nurse can place an eSignature**    Nurse 2 eSignature: Electronically signed by Tomasa Rushing RN on 7/15/23 at 5:49 AM EDT
60 [facility]  [address] [place] [address] Drive, [address], [address] [place] [address] Drive  Phone: [phone]  Office Hours: 8:30AM - 4:30PM  Monday - Friday    Nephrology consult completed  Full note to follow
60 [facility]  [address] [place] [address] Drive, [address] [facility], [address] [place] [address]  Phone: [phone]  Office Hours: 8:30AM - 4:30PM  Monday - Friday    MDM and follow up    She has Ecoli ESBL  Going on IV therapy at home   Kalli Zamarripa on the phone  Reviewed  Renal issues with her  Making urine  Labs reviewed with them  Basic Metabolic Panel   Collected: 07/17/23 0202  Final result  Specimen: Blood      Sodium 136 MMOL/L BUN 26 High  MG/DL   Potassium 4.2 MMOL/L Creatinine 2.4 High  MG/DL   Chloride 104 mMol/L Est, Glom Filt Rate 21 Low  mL/min/1.73m2    CO2 17 Low  MMOL/L Glucose 133 High  MG/DL   Anion Gap 15 Calcium 8.8 MG        Creat unchanged at 2.4  K and Na normal  Will request to arrange OP BMP every week with [facility] [address] upon discharge and fax the results to me at [phone]  She will call and set up OP appt with me in 3 weeks or so if renal function remains stable  If worse will see her sooner
Outpatient Pharmacy Progress Note for Meds-to-Beds    Total number of Prescriptions Filled: 2  The following medications were dispensed to the patient during the discharge process:  Gabapentin   Geritussin    Additional Documentation:  Medications given to nurse Marilou Arriola to provide to patient      Thank you for letting us serve your patients.   [address]    [address] [place] [address], [address] [place] [address]    Phone: [phone]    Fax: [phone]
Patient's home CPAP in the room
Physician Progress Note      Dianne Mancia  SAL #:                  [id number]  :                       1950  ADMIT DATE:       2023 7:16 PM  [facility] DATE:        2023 8:38 PM  RESPONDING  PROVIDER #:        Nadiya Moncada MD          QUERY TEXT:    Patient admitted with COVID-19 infection. Noted documentation of Acute Kidney   Injury in H&P on . In order to support the diagnosis of SANDRA, please   include additional clinical indicators in your documentation. ? Or please   document if the diagnosis of SANDRA has been ruled out after further study. The medical record reflects the following:  Risk Factors: DM, CKD, volume depletion, Dehydration  Clinical Indicators: H&P  ? SANDRA - Cr 2.6 on admission, baseline    1.9.? Nephrology consult  ? SANDRA likely from volume depletion unlikely   from ATN or obstruction. Dehydration. CKD3- concern for any myeloma kidney-   urine protein is trace? BUN/Cr/GFR 30/2.6/19 on ; 27/2.4/21 on 07/15; 25/2.5/20 on ;   26/2.4/21 on   Treatment: IVFs, antihypertensives, monitor renal function, Labs, Strict   I/O's, Bladder scan    Defined by Kidney Disease Improving Global Outcomes (KDIGO) clinical practice   guideline for acute kidney injury:  -Increase in SCr by greater than or equal to 0.3 mg/dl within 48 hours; or  -Increase or decrease in SCr to greater than or equal to 1.5 times baseline,   which is known or presumed to have occurred within the prior 7 days; or  -Urine volume < 0.5ml/kg/h for 6 hours. Options provided:  -- Acute kidney injury evidenced by, Please document evidence as well as a   numerical baseline creatinine, if known.   -- Acute kidney injury ruled out after study  -- Other - I will add my own diagnosis  -- Disagree - Not applicable / Not valid  -- Disagree - Clinically unable to determine / Unknown  -- Refer to Clinical Documentation Reviewer    PROVIDER RESPONSE TEXT:    This patient has acute
Pt given discharge instructions. Verbalized understanding. Pt is discharging with PICC line to home. PICC line intact. No c/o voiced. Pt taken down to private auto with belongings.
Q24H X 3 days IV pending final urine culture   - Nephro on board      # Fall and Weakness 2/2 SANDRA, UTI, COVID  - Due to weakness as above, no previous episodes. Endorsed hitting head. On ASA only. - No FND or other issues. CTH and c-spine non-acute. # Hypomagnesemia  - 1.6  - Replace with oral      # T2DM with hyperglycemia  - Decreased home Insulin Lantus to 15 u qhs with LCSI. - A1c 6.1% but patient is anemic      # Enlarged left thyroid lobe  - Followed by [facility]. - Continue Synthroid 88mcg daily.- TSH 0.224, FT4 0.99     # Macrocytic anemia  - No evidence of bleeding.  - Followed by [facility] H/O.   - Hb 9.6 today      # MM  - Followed by [facility] H/O.   - On prophylactic acyclovir PO 400mg BID    # Class III Obesity   - BMI 42.75        # Peptic ulcer prophylaxis: Pantoprazole 40mg OD   # DVT Prophylaxis: Enoxaparin 30mg daily       Current living situation: Home  Expected Disposition: Home  Estimated discharge date: 1-2 days. Here with COVID and SANDRA. On IVF. On IV abx. F/U final urine culture. Personally reviewed Lab Studies and Imaging     Drugs that require monitoring for toxicity include acyclovir and the method of monitoring was kidney function  Drugs that require monitoring for toxicity include lantus and humalog and the method of monitoring was POCT glucose, watch for hypoglycemia         Discussed with nephro- watch for at least 1 more day - assess kidney function in the AM to see if it is on a downward trend. Review of System     Review of Systems   Constitutional:  Negative for chills, fatigue, fever and unexpected weight change. Eyes:  Negative for pain and visual disturbance. Respiratory:  Positive for cough (Improving). Negative for choking, chest tightness, shortness of breath, wheezing and stridor. Cardiovascular:  Negative for chest pain, palpitations and leg swelling.    Gastrointestinal:  Negative for abdominal distention, abdominal pain, blood in stool, constipation,
4 - 16   Magnesium    Collection Time: 07/14/23  8:00 PM   Result Value Ref Range    Magnesium 1.1 (L) 1.8 - 2.4 mg/dl   Lactate, Sepsis    Collection Time: 07/14/23  8:00 PM   Result Value Ref Range    Lactic Acid, Sepsis 1.3 0.5 - 1.9 mMOL/L   EKG 12 Lead    Collection Time: 07/14/23  8:01 PM   Result Value Ref Range    Ventricular Rate 74 BPM    Atrial Rate 74 BPM    P-R Interval 144 ms    QRS Duration 122 ms    Q-T Interval 444 ms    QTc Calculation (Bazett) 492 ms    P Axis 86 degrees    R Axis -30 degrees    T Axis 86 degrees    Diagnosis       Normal sinus rhythm  Left axis deviation  Right bundle branch block  Left ventricular hypertrophy with repolarization abnormality  Abnormal ECG  No previous ECGs available     Urinalysis    Collection Time: 07/14/23 11:00 PM   Result Value Ref Range    Color, UA YELLOW YELLOW    Clarity, UA CLEAR CLEAR    Glucose, Urine NEGATIVE NEGATIVE MG/DL    Bilirubin Urine NEGATIVE NEGATIVE MG/DL    Ketones, Urine NEGATIVE NEGATIVE MG/DL    Specific Gravity, UA 1.015 1.001 - 1.035    Blood, Urine SMALL NUMBER OR AMOUNT OBSERVED (A) NEGATIVE    pH, Urine 6.0 5.0 - 8.0    Protein, UA TRACE (A) NEGATIVE MG/DL    Urobilinogen, Urine 0.2 0.2 - 1.0 MG/DL    Nitrite Urine, Quantitative NEGATIVE NEGATIVE    Leukocyte Esterase, Urine TRACE (A) NEGATIVE   Microscopic Urinalysis    Collection Time: 07/14/23 11:00 PM   Result Value Ref Range    RBC, UA 3 0 - 6 /HPF    WBC, UA 43 (H) 0 - 5 /HPF    Bacteria, UA MANY (A) NEGATIVE /HPF    WBC Clumps, UA RARE /HPF    Yeast, UA FEW /HPF    Mucus, UA RARE (A) NEGATIVE HPF    Trichomonas NONE SEEN NONE SEEN /HPF   CK    Collection Time: 07/15/23  5:37 AM   Result Value Ref Range    Total  26 - 140 IU/L   TSH    Collection Time: 07/15/23  5:37 AM   Result Value Ref Range    TSH, High Sensitivity 0.224 (L) 0.270 - 4.20 uIu/ml   CBC with Auto Differential    Collection Time: 07/15/23  5:37 AM   Result Value Ref Range    WBC 5.2 4.0 - 10.5 K/CU MM

## 2023-07-22 LAB
CULTURE: NORMAL
CULTURE: NORMAL
Lab: NORMAL
Lab: NORMAL
SPECIMEN: NORMAL
SPECIMEN: NORMAL

## 2023-07-24 ENCOUNTER — HOSPITAL ENCOUNTER (OUTPATIENT)
Age: 73
Setting detail: SPECIMEN
Discharge: HOME | End: 2023-07-24
Payer: MEDICARE

## 2023-07-24 LAB
ALBUMIN SERPL-MCNC: 4.5 GM/DL (ref 3.4–5)
ALP BLD-CCNC: 104 IU/L (ref 40–129)
ALT SERPL-CCNC: 10 U/L (ref 10–40)
ANION GAP SERPL CALCULATED.3IONS-SCNC: 19 MMOL/L (ref 4–16)
AST SERPL-CCNC: 14 IU/L (ref 15–37)
BASOPHILS ABSOLUTE: 0.1 K/CU MM
BASOPHILS RELATIVE PERCENT: 1.1 % (ref 0–1)
BILIRUB SERPL-MCNC: 0.3 MG/DL (ref 0–1)
BUN SERPL-MCNC: 41 MG/DL (ref 6–23)
CALCIUM SERPL-MCNC: 10.1 MG/DL (ref 8.3–10.6)
CHLORIDE BLD-SCNC: 104 MMOL/L (ref 99–110)
CO2: 18 MMOL/L (ref 21–32)
CREAT SERPL-MCNC: 2.3 MG/DL (ref 0.6–1.1)
DIFFERENTIAL TYPE: ABNORMAL
EOSINOPHILS ABSOLUTE: 0.6 K/CU MM
EOSINOPHILS RELATIVE PERCENT: 10.7 % (ref 0–3)
ERYTHROCYTE SEDIMENTATION RATE: 52 MM/HR (ref 0–30)
GFR SERPL CREATININE-BSD FRML MDRD: 22 ML/MIN/1.73M2
GLUCOSE SERPL-MCNC: 108 MG/DL (ref 70–99)
HCT VFR BLD CALC: 39.7 % (ref 37–47)
HEMOGLOBIN: 11.8 GM/DL (ref 12.5–16)
IMMATURE NEUTROPHIL %: 1.1 % (ref 0–0.43)
LYMPHOCYTES ABSOLUTE: 0.6 K/CU MM
LYMPHOCYTES RELATIVE PERCENT: 9.7 % (ref 24–44)
MAGNESIUM: 1.4 MG/DL (ref 1.8–2.4)
MCH RBC QN AUTO: 30.2 PG (ref 27–31)
MCHC RBC AUTO-ENTMCNC: 29.7 % (ref 32–36)
MCV RBC AUTO: 101.5 FL (ref 78–100)
MONOCYTES ABSOLUTE: 0.6 K/CU MM
MONOCYTES RELATIVE PERCENT: 10.7 % (ref 0–4)
PDW BLD-RTO: 14.8 % (ref 11.7–14.9)
PHOSPHORUS: 3.5 MG/DL (ref 2.5–4.9)
PLATELET # BLD: 210 K/CU MM (ref 140–440)
PMV BLD AUTO: 10.6 FL (ref 7.5–11.1)
POTASSIUM SERPL-SCNC: 4.3 MMOL/L (ref 3.5–5.1)
RBC # BLD: 3.91 M/CU MM (ref 4.2–5.4)
SEGMENTED NEUTROPHILS ABSOLUTE COUNT: 3.8 K/CU MM
SEGMENTED NEUTROPHILS RELATIVE PERCENT: 66.7 % (ref 36–66)
SODIUM BLD-SCNC: 141 MMOL/L (ref 135–145)
TOTAL IMMATURE NEUTOROPHIL: 0.06 K/CU MM
TOTAL PROTEIN: 7.2 GM/DL (ref 6.4–8.2)
WBC # BLD: 5.7 K/CU MM (ref 4–10.5)

## 2023-07-24 PROCEDURE — 83735 ASSAY OF MAGNESIUM: CPT

## 2023-07-24 PROCEDURE — 86140 C-REACTIVE PROTEIN: CPT

## 2023-07-24 PROCEDURE — 85652 RBC SED RATE AUTOMATED: CPT

## 2023-07-24 PROCEDURE — 85025 COMPLETE CBC W/AUTO DIFF WBC: CPT

## 2023-07-24 PROCEDURE — 84100 ASSAY OF PHOSPHORUS: CPT

## 2023-07-24 PROCEDURE — 80053 COMPREHEN METABOLIC PANEL: CPT

## 2023-07-25 LAB — CRP SERPL HS-MCNC: 3 MG/L

## 2023-07-27 LAB
CULTURE: NORMAL
GRAM SMEAR: NORMAL
Lab: NORMAL
SPECIMEN: NORMAL

## 2023-07-31 ENCOUNTER — HOSPITAL ENCOUNTER (OUTPATIENT)
Age: 73
Setting detail: SPECIMEN
Discharge: HOME | End: 2023-07-31
Payer: MEDICARE

## 2023-07-31 LAB
ALBUMIN SERPL-MCNC: 4.1 GM/DL (ref 3.4–5)
ALP BLD-CCNC: 80 IU/L (ref 40–128)
ALT SERPL-CCNC: 8 U/L (ref 10–40)
ANION GAP SERPL CALCULATED.3IONS-SCNC: 12 MMOL/L (ref 4–16)
AST SERPL-CCNC: 12 IU/L (ref 15–37)
BASOPHILS ABSOLUTE: 0 K/CU MM
BASOPHILS RELATIVE PERCENT: 1.2 % (ref 0–1)
BILIRUB SERPL-MCNC: 0.2 MG/DL (ref 0–1)
BUN SERPL-MCNC: 32 MG/DL (ref 6–23)
CALCIUM SERPL-MCNC: 9.3 MG/DL (ref 8.3–10.6)
CHLORIDE BLD-SCNC: 105 MMOL/L (ref 99–110)
CO2: 24 MMOL/L (ref 21–32)
CREAT SERPL-MCNC: 2.5 MG/DL (ref 0.6–1.1)
CRP SERPL HS-MCNC: 3 MG/L
DIFFERENTIAL TYPE: ABNORMAL
EOSINOPHILS ABSOLUTE: 0.4 K/CU MM
EOSINOPHILS RELATIVE PERCENT: 11.5 % (ref 0–3)
ERYTHROCYTE SEDIMENTATION RATE: 26 MM/HR (ref 0–30)
GFR SERPL CREATININE-BSD FRML MDRD: 20 ML/MIN/1.73M2
GLUCOSE SERPL-MCNC: 147 MG/DL (ref 70–99)
HCT VFR BLD CALC: 38.4 % (ref 37–47)
HEMOGLOBIN: 11.4 GM/DL (ref 12.5–16)
IMMATURE NEUTROPHIL %: 0.6 % (ref 0–0.43)
LYMPHOCYTES ABSOLUTE: 0.5 K/CU MM
LYMPHOCYTES RELATIVE PERCENT: 13.6 % (ref 24–44)
MAGNESIUM: 1.3 MG/DL (ref 1.8–2.4)
MCH RBC QN AUTO: 31.1 PG (ref 27–31)
MCHC RBC AUTO-ENTMCNC: 29.7 % (ref 32–36)
MCV RBC AUTO: 104.6 FL (ref 78–100)
MONOCYTES ABSOLUTE: 0.5 K/CU MM
MONOCYTES RELATIVE PERCENT: 13.3 % (ref 0–4)
NUCLEATED RBC %: 0 %
PDW BLD-RTO: 14.6 % (ref 11.7–14.9)
PHOSPHORUS: 3.4 MG/DL (ref 2.5–4.9)
PLATELET # BLD: 143 K/CU MM (ref 140–440)
PMV BLD AUTO: 10.8 FL (ref 7.5–11.1)
POTASSIUM SERPL-SCNC: 4.4 MMOL/L (ref 3.5–5.1)
RBC # BLD: 3.67 M/CU MM (ref 4.2–5.4)
SEGMENTED NEUTROPHILS ABSOLUTE COUNT: 2 K/CU MM
SEGMENTED NEUTROPHILS RELATIVE PERCENT: 59.8 % (ref 36–66)
SODIUM BLD-SCNC: 141 MMOL/L (ref 135–145)
TOTAL IMMATURE NEUTOROPHIL: 0.02 K/CU MM
TOTAL NUCLEATED RBC: 0 K/CU MM
TOTAL PROTEIN: 6.5 GM/DL (ref 6.4–8.2)
WBC # BLD: 3.4 K/CU MM (ref 4–10.5)

## 2023-07-31 PROCEDURE — 83735 ASSAY OF MAGNESIUM: CPT

## 2023-07-31 PROCEDURE — 84100 ASSAY OF PHOSPHORUS: CPT

## 2023-07-31 PROCEDURE — 85652 RBC SED RATE AUTOMATED: CPT

## 2023-07-31 PROCEDURE — 80053 COMPREHEN METABOLIC PANEL: CPT

## 2023-07-31 PROCEDURE — 85025 COMPLETE CBC W/AUTO DIFF WBC: CPT

## 2023-07-31 PROCEDURE — 86140 C-REACTIVE PROTEIN: CPT

## 2023-08-01 PROBLEM — E83.42 HYPOMAGNESEMIA: Status: ACTIVE | Noted: 2023-08-01

## 2023-08-01 PROBLEM — N18.4 CHRONIC KIDNEY DISEASE, STAGE IV (SEVERE) (HCC): Status: ACTIVE | Noted: 2023-08-01

## 2023-09-01 ENCOUNTER — HOSPITAL ENCOUNTER (OUTPATIENT)
Age: 73
Setting detail: SPECIMEN
Discharge: HOME | End: 2023-09-01
Payer: MEDICARE

## 2023-09-01 LAB
BACTERIA: NEGATIVE /HPF
BILIRUBIN URINE: NEGATIVE MG/DL
BLOOD, URINE: NEGATIVE
CLARITY: CLEAR
COLOR: YELLOW
GLUCOSE, URINE: NEGATIVE MG/DL
KETONES, URINE: NEGATIVE MG/DL
LEUKOCYTE ESTERASE, URINE: NEGATIVE
NITRITE URINE, QUANTITATIVE: NEGATIVE
PH, URINE: 6 (ref 5–8)
PROTEIN UA: ABNORMAL MG/DL
RBC URINE: <1 /HPF (ref 0–6)
SPECIFIC GRAVITY UA: 1.02 (ref 1–1.03)
SQUAMOUS EPITHELIAL: 1 /HPF
TRICHOMONAS: NORMAL /HPF
UROBILINOGEN, URINE: 0.2 MG/DL (ref 0.2–1)
WBC UA: 1 /HPF (ref 0–5)
YEAST: NORMAL /HPF

## 2023-09-01 PROCEDURE — 87077 CULTURE AEROBIC IDENTIFY: CPT

## 2023-09-01 PROCEDURE — 87186 SC STD MICRODIL/AGAR DIL: CPT

## 2023-09-01 PROCEDURE — 81001 URINALYSIS AUTO W/SCOPE: CPT

## 2023-09-01 PROCEDURE — 87086 URINE CULTURE/COLONY COUNT: CPT

## 2023-09-03 LAB
CULTURE: ABNORMAL
CULTURE: ABNORMAL
Lab: ABNORMAL
SPECIMEN: ABNORMAL

## 2023-09-22 ENCOUNTER — HOSPITAL ENCOUNTER (OUTPATIENT)
Age: 73
Setting detail: SPECIMEN
Discharge: HOME | End: 2023-09-22
Payer: MEDICARE

## 2023-09-22 LAB
BACTERIA: ABNORMAL /HPF
BILIRUBIN URINE: NEGATIVE MG/DL
BLOOD, URINE: ABNORMAL
CAST TYPE: ABNORMAL /HPF
CLARITY: CLEAR
COLOR: YELLOW
CRYSTAL TYPE: NEGATIVE /HPF
EPITHELIAL CELLS, UA: 5 /HPF
GLUCOSE, URINE: NEGATIVE MG/DL
KETONES, URINE: NEGATIVE MG/DL
LEUKOCYTE ESTERASE, URINE: ABNORMAL
NITRITE URINE, QUANTITATIVE: POSITIVE
PH, URINE: 6 (ref 5–8)
PROTEIN UA: 30 MG/DL
RBC URINE: 2 /HPF (ref 0–6)
SPECIFIC GRAVITY UA: 1.02 (ref 1–1.03)
UROBILINOGEN, URINE: 0.2 MG/DL (ref 0.2–1)
WBC UA: 5 /HPF (ref 0–5)

## 2023-09-22 PROCEDURE — 81001 URINALYSIS AUTO W/SCOPE: CPT

## 2023-09-22 PROCEDURE — 87086 URINE CULTURE/COLONY COUNT: CPT

## 2023-09-24 LAB
CULTURE: ABNORMAL
CULTURE: ABNORMAL
Lab: ABNORMAL
SPECIMEN: ABNORMAL

## 2023-09-25 LAB
CULTURE: ABNORMAL
CULTURE: ABNORMAL
Lab: ABNORMAL
SPECIMEN: ABNORMAL

## 2023-10-02 NOTE — CONSULTS
Consult in progress. PICC requested per Dr. Lorenzo Kelley for OutPt IV Abx regimen 2/2 ESBL UTI. Pt has Hx of CKD4. Dr. Viri Thompson, Nephrology, called & OK's PICC placement. Pt called & notified.

## 2023-10-02 NOTE — PROGRESS NOTES
IR Procedure at [facility]:  Spoke with patient and she will arrive at 0700 at [facility] on 10/3/2023 for her procedure at 0800. Also left a message for PICC team.    NPO at Midnight     (Paracentesis, Thoracentesis, Thyroid Bx and Injections may have a light breakfast)  2. Follow your directions as prescribed by the doctor for your procedure and medications. 3.   Consult your provider as to when to stop blood thinner  4. Do not take any pain medication within 6 hours of your procedure  5. Do not drink any alcoholic beverages or use any street drugs 24 hours before procedure. 6.   Please wear simple, loose fitting clothing to the hospital.  Do not bring valuables (money,             credit cards, checkbooks, etc.)     7. If you  have a Living Will and Durable Power of  for Healthcare, please bring in a copy. 8.   Please bring picture ID,  insurance card, paperwork from the doctors office            (H & P, Consent,  & card for implantable devices). 9.   Report to the information desk on the ground floor. 10. Take a shower the night before or morning of your procedure, do not apply any lotion, oil or powder. 11. If you are going to be sedated for the procedure, you will need a responsible adult to drive you home.

## 2023-10-03 ENCOUNTER — HOSPITAL ENCOUNTER (OUTPATIENT)
Dept: SURGERY | Age: 73
Discharge: HOME | End: 2023-10-05
Payer: MEDICARE

## 2023-10-03 VITALS
OXYGEN SATURATION: 97 % | SYSTOLIC BLOOD PRESSURE: 128 MMHG | TEMPERATURE: 96.8 F | RESPIRATION RATE: 18 BRPM | HEART RATE: 81 BPM | DIASTOLIC BLOOD PRESSURE: 61 MMHG

## 2023-10-03 PROCEDURE — C1769 GUIDE WIRE: HCPCS

## 2023-10-03 PROCEDURE — C1751 CATH, INF, PER/CENT/MIDLINE: HCPCS

## 2023-10-03 PROCEDURE — 76937 US GUIDE VASCULAR ACCESS: CPT

## 2023-10-03 PROCEDURE — 36569 INSJ PICC 5 YR+ W/O IMAGING: CPT

## 2023-10-03 ASSESSMENT — PAIN DESCRIPTION - PAIN TYPE: TYPE: CHRONIC PAIN

## 2023-10-03 ASSESSMENT — PAIN DESCRIPTION - DESCRIPTORS: DESCRIPTORS: ACHING;DISCOMFORT

## 2023-10-03 ASSESSMENT — PAIN SCALES - GENERAL: PAINLEVEL_OUTOF10: 3

## 2023-10-03 ASSESSMENT — PAIN DESCRIPTION - ONSET: ONSET: ON-GOING

## 2023-10-03 ASSESSMENT — PAIN DESCRIPTION - LOCATION: LOCATION: BUTTOCKS;LEG

## 2023-10-03 ASSESSMENT — PAIN DESCRIPTION - FREQUENCY: FREQUENCY: CONTINUOUS

## 2023-10-03 ASSESSMENT — PAIN - FUNCTIONAL ASSESSMENT: PAIN_FUNCTIONAL_ASSESSMENT: PREVENTS OR INTERFERES SOME ACTIVE ACTIVITIES AND ADLS

## 2023-10-03 ASSESSMENT — PAIN DESCRIPTION - ORIENTATION: ORIENTATION: LEFT

## 2023-10-03 NOTE — PROGRESS NOTES
4345 Cath picc complete. Patient A+Ox4,  patient has no c/o pain, and denies any needs at this time. Beverage offered. Call light in reach, bed in low position. RN to continue to monitor. Will call to waiting room for visitor/family. 2131 Patient discharge instructions reviewed. All questions answered. Discharge paperwork signed by RN and patient.  0508 Discharged to car via wheelchair, home with spouse.

## 2023-10-18 ENCOUNTER — HOSPITAL ENCOUNTER (OUTPATIENT)
Age: 73
Setting detail: SPECIMEN
Discharge: HOME | End: 2023-10-18
Payer: MEDICARE

## 2023-10-18 LAB
BILIRUBIN URINE: NEGATIVE MG/DL
BLOOD, URINE: NEGATIVE
CLARITY: CLEAR
COLOR: YELLOW
COMMENT UA: NORMAL
GLUCOSE, URINE: NEGATIVE MG/DL
KETONES, URINE: NEGATIVE MG/DL
LEUKOCYTE ESTERASE, URINE: NEGATIVE
NITRITE URINE, QUANTITATIVE: NEGATIVE
PH, URINE: 5.5 (ref 5–8)
PROTEIN UA: NEGATIVE MG/DL
SPECIFIC GRAVITY UA: 1.01 (ref 1–1.03)
UROBILINOGEN, URINE: 0.2 MG/DL (ref 0.2–1)

## 2023-10-18 PROCEDURE — 81003 URINALYSIS AUTO W/O SCOPE: CPT

## 2023-10-18 PROCEDURE — 87086 URINE CULTURE/COLONY COUNT: CPT

## 2023-10-19 LAB
CULTURE: NORMAL
Lab: NORMAL
SPECIMEN: NORMAL

## 2023-11-09 ENCOUNTER — HOSPITAL ENCOUNTER (OUTPATIENT)
Age: 73
Setting detail: SPECIMEN
Discharge: HOME | End: 2023-11-09
Payer: MEDICARE

## 2023-11-09 LAB
BACTERIA: ABNORMAL /HPF
BILIRUBIN URINE: NEGATIVE MG/DL
BLOOD, URINE: NEGATIVE
CAST TYPE: ABNORMAL /HPF
CLARITY: CLEAR
COLOR: YELLOW
CRYSTAL TYPE: NEGATIVE /HPF
EPITHELIAL CELLS, UA: 30 /HPF
GLUCOSE, URINE: NEGATIVE MG/DL
KETONES, URINE: NEGATIVE MG/DL
LEUKOCYTE ESTERASE, URINE: NEGATIVE
NITRITE URINE, QUANTITATIVE: NEGATIVE
PH, URINE: 5.5 (ref 5–8)
PROTEIN UA: ABNORMAL MG/DL
RBC URINE: 3 /HPF (ref 0–6)
SPECIFIC GRAVITY UA: >1.03 (ref 1–1.03)
UROBILINOGEN, URINE: 0.2 MG/DL (ref 0.2–1)
WBC UA: 5 /HPF (ref 0–5)

## 2023-11-09 PROCEDURE — 81001 URINALYSIS AUTO W/SCOPE: CPT

## 2023-11-09 PROCEDURE — 87077 CULTURE AEROBIC IDENTIFY: CPT

## 2023-11-09 PROCEDURE — 87086 URINE CULTURE/COLONY COUNT: CPT

## 2023-11-12 LAB
CULTURE: ABNORMAL
Lab: ABNORMAL
SPECIMEN: ABNORMAL

## 2024-01-12 ENCOUNTER — HOSPITAL ENCOUNTER (OUTPATIENT)
Age: 74
Setting detail: SPECIMEN
Discharge: HOME | End: 2024-01-12
Payer: MEDICARE

## 2024-01-12 LAB
BACTERIA: ABNORMAL /HPF
BILIRUBIN URINE: NEGATIVE MG/DL
BLOOD, URINE: ABNORMAL
CLARITY: ABNORMAL
COLOR: YELLOW
GLUCOSE, URINE: NEGATIVE MG/DL
KETONES, URINE: NEGATIVE MG/DL
LEUKOCYTE ESTERASE, URINE: NEGATIVE
MUCUS: ABNORMAL HPF
NITRITE URINE, QUANTITATIVE: POSITIVE
PH, URINE: 5.5 (ref 5–8)
PROTEIN UA: ABNORMAL MG/DL
RBC URINE: 1 /HPF (ref 0–6)
SPECIFIC GRAVITY UA: 1.02 (ref 1–1.03)
SQUAMOUS EPITHELIAL: 2 /HPF
UROBILINOGEN, URINE: 0.2 MG/DL (ref 0.2–1)
WBC CLUMP: ABNORMAL /HPF
WBC UA: 12 /HPF (ref 0–5)

## 2024-01-12 PROCEDURE — 87086 URINE CULTURE/COLONY COUNT: CPT

## 2024-01-12 PROCEDURE — 81001 URINALYSIS AUTO W/SCOPE: CPT

## 2024-01-14 LAB
CULTURE: ABNORMAL
CULTURE: ABNORMAL
Lab: ABNORMAL
SPECIMEN: ABNORMAL

## 2024-01-15 LAB
CULTURE: ABNORMAL
CULTURE: ABNORMAL
Lab: ABNORMAL
SPECIMEN: ABNORMAL

## 2024-01-22 NOTE — PROGRESS NOTES
IR Procedure at [facility]:  Spoke with patient and she will arrive at 0700 at [facility] on 1/23/2024 for her procedure at 0800. Also went over below instructions.Left a message on PICC team line also.    NPO at Midnight     (Paracentesis, Thoracentesis, Thyroid Bx and Injections may have a light breakfast)  2.   Follow your directions as prescribed by the doctor for your procedure and medications.  3.   Consult your provider as to when to stop blood thinner  4.   Do not take any pain medication within 6 hours of your procedure  5.   Do not drink any alcoholic beverages or use any street drugs 24 hours before procedure.  6.   Please wear simple, loose fitting clothing to the hospital.  Do not bring valuables (money,             credit cards, checkbooks, etc.)     7.   If you  have a Living Will and Durable Power of  for Healthcare, please bring in a copy.  8.   Please bring picture ID,  insurance card, paperwork from the doctors office            (H & P, Consent,  & card for implantable devices).  9.   Report to the information desk on the ground floor.  10. Take a shower the night before or morning of your procedure, do not apply any lotion, oil or powder.  11. If you are going to be sedated for the procedure, you will need a responsible adult to drive you home.

## 2024-01-22 NOTE — CONSULTS
Line insertion scheduled for 1/23/24 @0800 in SDS per order from Dr. Travon Baig. Pt has Hx of CKD4 and is s/p Nephrectomy; per Hospital Policy, Dr. Lockhart (on-call for Dr. Serrano) called and gives verbal consent for line placement.

## 2024-01-23 ENCOUNTER — HOSPITAL ENCOUNTER (OUTPATIENT)
Dept: SURGERY | Age: 74
Discharge: HOME | End: 2024-01-25
Payer: MEDICARE

## 2024-01-23 VITALS
DIASTOLIC BLOOD PRESSURE: 80 MMHG | RESPIRATION RATE: 16 BRPM | TEMPERATURE: 97.5 F | HEART RATE: 76 BPM | SYSTOLIC BLOOD PRESSURE: 183 MMHG | OXYGEN SATURATION: 96 %

## 2024-01-23 PROCEDURE — 2709999900 HC NON-CHARGEABLE SUPPLY

## 2024-01-23 PROCEDURE — 36573 INSJ PICC RS&I 5 YR+: CPT

## 2024-01-23 PROCEDURE — C1751 CATH, INF, PER/CENT/MIDLINE: HCPCS

## 2024-01-23 PROCEDURE — 36569 INSJ PICC 5 YR+ W/O IMAGING: CPT

## 2024-01-23 PROCEDURE — 76937 US GUIDE VASCULAR ACCESS: CPT

## 2024-01-23 ASSESSMENT — PAIN - FUNCTIONAL ASSESSMENT: PAIN_FUNCTIONAL_ASSESSMENT: 0-10

## 2024-01-23 NOTE — PROGRESS NOTES
Assisted pt up to bathroom  0915 discharge instructions given to pt, voiced understanding.  0918  Pt escorted to main entrance for discharge with spouse

## 2024-01-23 NOTE — CONSULTS
PICC Consult complete.  Indication for line: Invanz, Chemo  Education regarding PICC insertion discussed and risks and benefits assessed with patient.  Patient request left arm insertion for ease of self administration of antibiotics.  Left cephalic too small, does not compress.  Patient verbalized understanding.  Consent signed by patient.  Time out done @ 0750.   PICC inserted in RONEY Basilic Vessel without difficulty per protocol.  Placement verified via VPSG4 monitoring system.  Good blood return and flushes easily on both ports.  Patient tolerated well.  Education pamphlets left in chart  Ultrasound photos of vein diameter and doppler signature placed in chart.   Please consult IV/PICC team if patient's needs change.    PICC OK to use.

## 2024-01-26 NOTE — PROGRESS NOTES
IR Procedure at [facility]: Left message for patient to arrive at 0900 at [facility] on 1/31/2024 for her procedure at 1030. Also went over below instructions and told patient to take her medications as scheduled.ADDENDUM: Spoke with patient and she is going to call Dr Baig's office about this procedure.    NPO at Midnight     2.   Follow your directions as prescribed by the doctor for your procedure and medications.  3.   Consult your provider as to when to stop blood thinner  4.   Do not take any pain medication within 6 hours of your procedure  5.   Do not drink any alcoholic beverages or use any street drugs 24 hours before procedure.  6.   Please wear simple, loose fitting clothing to the hospital.  Do not bring valuables (money,             credit cards, checkbooks, etc.)     7.   If you  have a Living Will and Durable Power of  for Healthcare, please bring in a copy.  8.   Please bring picture ID,  insurance card, paperwork from the doctors office            (H & P, Consent,  & card for implantable devices).  9.   Report to the information desk on the ground floor.  10. Take a shower the night before or morning of your procedure, do not apply any lotion, oil or powder.  11. If you are going to be sedated for the procedure, you will need a responsible adult to drive you home.

## 2024-01-30 RX ORDER — SODIUM CHLORIDE 0.9 % (FLUSH) 0.9 %
10 SYRINGE (ML) INJECTION PRN
OUTPATIENT
Start: 2024-01-30

## 2024-01-31 ENCOUNTER — HOSPITAL ENCOUNTER (OUTPATIENT)
Dept: INTERVENTIONAL RADIOLOGY/VASCULAR | Age: 74
Discharge: HOME | End: 2024-01-31

## 2024-02-05 ENCOUNTER — HOSPITAL ENCOUNTER (OUTPATIENT)
Age: 74
Setting detail: SPECIMEN
Discharge: HOME | End: 2024-02-05
Payer: MEDICARE

## 2024-02-05 LAB — PRO-BNP: 697.7 PG/ML

## 2024-02-05 PROCEDURE — 83880 ASSAY OF NATRIURETIC PEPTIDE: CPT

## 2024-03-08 ENCOUNTER — HOSPITAL ENCOUNTER (OUTPATIENT)
Age: 74
Setting detail: SPECIMEN
Discharge: HOME | End: 2024-03-08
Payer: MEDICARE

## 2024-03-08 LAB
BILIRUBIN URINE: NEGATIVE MG/DL
BLOOD, URINE: NEGATIVE
CLARITY: CLEAR
COLOR: YELLOW
COMMENT UA: NORMAL
GLUCOSE, URINE: NEGATIVE MG/DL
KETONES, URINE: NEGATIVE MG/DL
LEUKOCYTE ESTERASE, URINE: NEGATIVE
NITRITE URINE, QUANTITATIVE: NEGATIVE
PH, URINE: 5.5 (ref 5–8)
PROTEIN UA: NEGATIVE MG/DL
SPECIFIC GRAVITY UA: 1.02 (ref 1–1.03)
UROBILINOGEN, URINE: 0.2 MG/DL (ref 0.2–1)

## 2024-03-08 PROCEDURE — 87086 URINE CULTURE/COLONY COUNT: CPT

## 2024-03-08 PROCEDURE — 81003 URINALYSIS AUTO W/O SCOPE: CPT

## 2024-04-04 ENCOUNTER — HOSPITAL ENCOUNTER (OUTPATIENT)
Age: 74
Setting detail: SPECIMEN
Discharge: HOME | End: 2024-04-04
Payer: MEDICARE

## 2024-04-04 PROCEDURE — 87086 URINE CULTURE/COLONY COUNT: CPT

## 2024-04-04 PROCEDURE — 36415 COLL VENOUS BLD VENIPUNCTURE: CPT

## 2024-04-05 ENCOUNTER — HOSPITAL ENCOUNTER (OUTPATIENT)
Age: 74
Discharge: HOME | End: 2024-04-05
Payer: MEDICARE

## 2024-04-05 DIAGNOSIS — E83.42 HYPOMAGNESEMIA: ICD-10-CM

## 2024-04-05 DIAGNOSIS — N18.4 CHRONIC KIDNEY DISEASE, STAGE IV (SEVERE) (HCC): ICD-10-CM

## 2024-04-05 DIAGNOSIS — N17.9 AKI (ACUTE KIDNEY INJURY) (HCC): ICD-10-CM

## 2024-04-05 LAB
BASOPHILS ABSOLUTE: 0 K/CU MM
BASOPHILS RELATIVE PERCENT: 1 % (ref 0–1)
CHOLESTEROL, FASTING: 194 MG/DL
CREATININE URINE: 112.7 MG/DL (ref 28–217)
CULTURE: NORMAL
DIFFERENTIAL TYPE: ABNORMAL
EOSINOPHILS ABSOLUTE: 0 K/CU MM
EOSINOPHILS RELATIVE PERCENT: 1.3 % (ref 0–3)
HCT VFR BLD CALC: 27.5 % (ref 37–47)
HDLC SERPL-MCNC: 39 MG/DL
HEMOGLOBIN: 8.3 GM/DL (ref 12.5–16)
IMMATURE NEUTROPHIL %: 1.3 % (ref 0–0.43)
LDLC SERPL CALC-MCNC: ABNORMAL MG/DL
LDLC SERPL-MCNC: 92 MG/DL
LYMPHOCYTES ABSOLUTE: 0.1 K/CU MM
LYMPHOCYTES RELATIVE PERCENT: 4.5 % (ref 24–44)
Lab: NORMAL
MCH RBC QN AUTO: 33.7 PG (ref 27–31)
MCHC RBC AUTO-ENTMCNC: 30.2 % (ref 32–36)
MCV RBC AUTO: 111.8 FL (ref 78–100)
MONOCYTES ABSOLUTE: 0.3 K/CU MM
MONOCYTES RELATIVE PERCENT: 9.4 % (ref 0–4)
NUCLEATED RBC %: 0 %
PDW BLD-RTO: 14.5 % (ref 11.7–14.9)
PLATELET # BLD: 81 K/CU MM (ref 140–440)
PMV BLD AUTO: 10.6 FL (ref 7.5–11.1)
PROT/CREAT RATIO, UR: 0.2
RBC # BLD: 2.46 M/CU MM (ref 4.2–5.4)
SEGMENTED NEUTROPHILS ABSOLUTE COUNT: 2.6 K/CU MM
SEGMENTED NEUTROPHILS RELATIVE PERCENT: 82.5 % (ref 36–66)
SPECIMEN: NORMAL
T3 FREE: 2.3 PG/ML (ref 2.3–4.2)
T4 FREE SERPL-MCNC: 0.87 NG/DL (ref 0.9–1.8)
TOTAL IMMATURE NEUTOROPHIL: 0.04 K/CU MM
TOTAL NUCLEATED RBC: 0 K/CU MM
TRIGLYCERIDE, FASTING: 458 MG/DL
TSH SERPL DL<=0.005 MIU/L-ACNC: 1.07 UIU/ML (ref 0.27–4.2)
URINE TOTAL PROTEIN: 27.4 MG/DL
WBC # BLD: 3.1 K/CU MM (ref 4–10.5)

## 2024-04-05 PROCEDURE — 80048 BASIC METABOLIC PNL TOTAL CA: CPT

## 2024-04-05 PROCEDURE — 83721 ASSAY OF BLOOD LIPOPROTEIN: CPT

## 2024-04-05 PROCEDURE — 84156 ASSAY OF PROTEIN URINE: CPT

## 2024-04-05 PROCEDURE — 82570 ASSAY OF URINE CREATININE: CPT

## 2024-04-05 PROCEDURE — 85025 COMPLETE CBC W/AUTO DIFF WBC: CPT

## 2024-04-05 PROCEDURE — 36415 COLL VENOUS BLD VENIPUNCTURE: CPT

## 2024-04-05 PROCEDURE — 84443 ASSAY THYROID STIM HORMONE: CPT

## 2024-04-05 PROCEDURE — 80061 LIPID PANEL: CPT

## 2024-04-05 PROCEDURE — 84481 FREE ASSAY (FT-3): CPT

## 2024-04-05 PROCEDURE — 84439 ASSAY OF FREE THYROXINE: CPT

## 2024-04-06 LAB
ANION GAP SERPL CALCULATED.3IONS-SCNC: 16 MMOL/L (ref 7–16)
BUN SERPL-MCNC: 32 MG/DL (ref 6–23)
CALCIUM SERPL-MCNC: 9.4 MG/DL (ref 8.3–10.6)
CHLORIDE BLD-SCNC: 107 MMOL/L (ref 99–110)
CO2: 19 MMOL/L (ref 21–32)
CREAT SERPL-MCNC: 2.2 MG/DL (ref 0.6–1.1)
GFR SERPL CREATININE-BSD FRML MDRD: 23 ML/MIN/1.73M2
GLUCOSE SERPL-MCNC: 148 MG/DL (ref 70–99)
POTASSIUM SERPL-SCNC: 4 MMOL/L (ref 3.5–5.1)
SODIUM BLD-SCNC: 142 MMOL/L (ref 135–145)

## 2024-04-07 LAB
ALBUMIN SERPL-MCNC: 4 GM/DL (ref 3.4–5)
ALP BLD-CCNC: 76 IU/L (ref 40–129)
ALT SERPL-CCNC: 15 U/L (ref 10–40)
ANION GAP SERPL CALCULATED.3IONS-SCNC: 17 MMOL/L (ref 7–16)
AST SERPL-CCNC: 12 IU/L (ref 15–37)
BILIRUB SERPL-MCNC: 0.3 MG/DL (ref 0–1)
BUN SERPL-MCNC: 33 MG/DL (ref 6–23)
CALCIUM SERPL-MCNC: 9.4 MG/DL (ref 8.3–10.6)
CHLORIDE BLD-SCNC: 108 MMOL/L (ref 99–110)
CO2: 18 MMOL/L (ref 21–32)
CREAT SERPL-MCNC: 2.1 MG/DL (ref 0.6–1.1)
GFR SERPL CREATININE-BSD FRML MDRD: 25 ML/MIN/1.73M2
GLUCOSE SERPL-MCNC: 146 MG/DL (ref 70–99)
POTASSIUM SERPL-SCNC: 4.2 MMOL/L (ref 3.5–5.1)
SODIUM BLD-SCNC: 143 MMOL/L (ref 135–145)
TOTAL PROTEIN: 6 GM/DL (ref 6.4–8.2)

## 2024-09-17 PROBLEM — N25.81 SECONDARY HYPERPARATHYROIDISM OF RENAL ORIGIN: Status: ACTIVE | Noted: 2024-09-17

## 2025-05-19 ENCOUNTER — HOSPITAL ENCOUNTER (OUTPATIENT)
Age: 75
Setting detail: SPECIMEN
Discharge: HOME | End: 2025-05-19

## 2025-05-19 LAB
ALBUMIN SERPL-MCNC: 4.2 G/DL (ref 3.4–5)
ALBUMIN/GLOB SERPL: 3 {RATIO} (ref 1.1–2.2)
ALP SERPL-CCNC: 63 U/L (ref 40–129)
ALT SERPL-CCNC: 16 U/L (ref 10–40)
ANION GAP SERPL CALCULATED.3IONS-SCNC: 19 MMOL/L (ref 9–17)
AST SERPL-CCNC: 13 U/L (ref 15–37)
BILIRUB SERPL-MCNC: 0.2 MG/DL (ref 0–1)
BUN SERPL-MCNC: 46 MG/DL (ref 7–20)
CALCIUM SERPL-MCNC: 9.2 MG/DL (ref 8.3–10.6)
CHLORIDE SERPL-SCNC: 101 MMOL/L (ref 99–110)
CO2 SERPL-SCNC: 20 MMOL/L (ref 21–32)
CREAT SERPL-MCNC: 3.4 MG/DL (ref 0.6–1.2)
GFR, ESTIMATED: 13 ML/MIN/1.73M2
GLUCOSE SERPL-MCNC: 190 MG/DL (ref 74–99)
POTASSIUM SERPL-SCNC: 3.6 MMOL/L (ref 3.5–5.1)
PROT SERPL-MCNC: 5.6 G/DL (ref 6.4–8.2)
SODIUM SERPL-SCNC: 140 MMOL/L (ref 136–145)

## 2025-05-19 PROCEDURE — 80053 COMPREHEN METABOLIC PANEL: CPT

## 2025-05-21 ENCOUNTER — HOSPITAL ENCOUNTER (OUTPATIENT)
Age: 75
Setting detail: SPECIMEN
Discharge: HOME | End: 2025-05-21
Payer: MEDICARE

## 2025-05-21 LAB
BACTERIA URNS QL MICRO: ABNORMAL
BILIRUB UR QL STRIP: NEGATIVE
CLARITY UR: ABNORMAL
COLOR UR: YELLOW
EPI CELLS #/AREA URNS HPF: 22 /HPF
GLUCOSE UR STRIP-MCNC: NEGATIVE MG/DL
HGB UR QL STRIP.AUTO: ABNORMAL
KETONES UR STRIP-MCNC: NEGATIVE MG/DL
LEUKOCYTE ESTERASE UR QL STRIP: ABNORMAL
MUCOUS THREADS URNS QL MICRO: ABNORMAL
NITRITE UR QL STRIP: NEGATIVE
PH UR STRIP: 5.5 [PH] (ref 5–8)
PROT UR STRIP-MCNC: ABNORMAL MG/DL
RBC #/AREA URNS HPF: 6 /HPF (ref 0–2)
SP GR UR STRIP: 1.01 (ref 1–1.03)
UROBILINOGEN UR STRIP-ACNC: 0.2 EU/DL (ref 0–1)
WBC #/AREA URNS HPF: 352 /HPF (ref 0–5)

## 2025-05-21 PROCEDURE — 81001 URINALYSIS AUTO W/SCOPE: CPT

## 2025-05-21 PROCEDURE — 87086 URINE CULTURE/COLONY COUNT: CPT

## 2025-05-21 PROCEDURE — 87186 SC STD MICRODIL/AGAR DIL: CPT

## 2025-05-21 PROCEDURE — 87077 CULTURE AEROBIC IDENTIFY: CPT

## 2025-05-23 LAB
MICROORGANISM SPEC CULT: ABNORMAL
SPECIMEN DESCRIPTION: ABNORMAL

## 2025-06-09 ENCOUNTER — HOSPITAL ENCOUNTER (EMERGENCY)
Age: 75
Discharge: HOME | End: 2025-06-09
Attending: EMERGENCY MEDICINE
Payer: MEDICARE

## 2025-06-09 ENCOUNTER — APPOINTMENT (OUTPATIENT)
Dept: GENERAL RADIOLOGY | Age: 75
End: 2025-06-09
Payer: MEDICARE

## 2025-06-09 VITALS
HEIGHT: 61 IN | BODY MASS INDEX: 39.46 KG/M2 | OXYGEN SATURATION: 97 % | RESPIRATION RATE: 16 BRPM | TEMPERATURE: 98.6 F | HEART RATE: 53 BPM | SYSTOLIC BLOOD PRESSURE: 143 MMHG | WEIGHT: 209 LBS | DIASTOLIC BLOOD PRESSURE: 76 MMHG

## 2025-06-09 DIAGNOSIS — R19.7 DIARRHEA, UNSPECIFIED TYPE: ICD-10-CM

## 2025-06-09 DIAGNOSIS — R11.0 NAUSEA: Primary | ICD-10-CM

## 2025-06-09 LAB
ALBUMIN SERPL-MCNC: 4.1 G/DL (ref 3.4–5)
ALBUMIN/GLOB SERPL: 1.9 {RATIO} (ref 1.1–2.2)
ALP SERPL-CCNC: 98 U/L (ref 40–129)
ALT SERPL-CCNC: 23 U/L (ref 10–40)
ANION GAP SERPL CALCULATED.3IONS-SCNC: 15 MMOL/L (ref 9–17)
AST SERPL-CCNC: 39 U/L (ref 15–37)
BASOPHILS # BLD: 0.02 K/UL
BASOPHILS NFR BLD: 1 % (ref 0–1)
BILIRUB SERPL-MCNC: 0.5 MG/DL (ref 0–1)
BUN SERPL-MCNC: 21 MG/DL (ref 7–20)
CALCIUM SERPL-MCNC: 9 MG/DL (ref 8.3–10.6)
CHLORIDE SERPL-SCNC: 94 MMOL/L (ref 99–110)
CO2 SERPL-SCNC: 27 MMOL/L (ref 21–32)
CREAT SERPL-MCNC: 2.1 MG/DL (ref 0.6–1.2)
EOSINOPHIL # BLD: 0.08 K/UL
EOSINOPHILS RELATIVE PERCENT: 3 % (ref 0–3)
ERYTHROCYTE [DISTWIDTH] IN BLOOD BY AUTOMATED COUNT: 13.4 % (ref 11.7–14.9)
GFR, ESTIMATED: 23 ML/MIN/1.73M2
GLUCOSE SERPL-MCNC: 162 MG/DL (ref 74–99)
HCT VFR BLD AUTO: 30.4 % (ref 37–47)
HGB BLD-MCNC: 10.4 G/DL (ref 12.5–16)
IMM GRANULOCYTES # BLD AUTO: 0.03 K/UL
IMM GRANULOCYTES NFR BLD: 1 %
LIPASE SERPL-CCNC: 9 U/L (ref 13–60)
LYMPHOCYTES NFR BLD: 0.15 K/UL
LYMPHOCYTES RELATIVE PERCENT: 6 % (ref 24–44)
MAGNESIUM SERPL-MCNC: 1.5 MG/DL (ref 1.8–2.4)
MCH RBC QN AUTO: 36.1 PG (ref 27–31)
MCHC RBC AUTO-ENTMCNC: 34.2 G/DL (ref 32–36)
MCV RBC AUTO: 105.6 FL (ref 78–100)
MONOCYTES NFR BLD: 0.31 K/UL
MONOCYTES NFR BLD: 12 % (ref 0–5)
NEUTROPHILS NFR BLD: 77 % (ref 36–66)
NEUTS SEG NFR BLD: 2.02 K/UL
PHOSPHATE SERPL-MCNC: 3.2 MG/DL (ref 2.5–4.9)
PLATELET, FLUORESCENCE: 80 K/UL (ref 140–440)
PMV BLD AUTO: 11 FL (ref 7.5–11.1)
POTASSIUM SERPL-SCNC: 3.6 MMOL/L (ref 3.5–5.1)
PROT SERPL-MCNC: 6.3 G/DL (ref 6.4–8.2)
RBC # BLD AUTO: 2.88 M/UL (ref 4.2–5.4)
SODIUM SERPL-SCNC: 136 MMOL/L (ref 136–145)
WBC OTHER # BLD: 2.6 K/UL (ref 4–10.5)

## 2025-06-09 PROCEDURE — 83690 ASSAY OF LIPASE: CPT

## 2025-06-09 PROCEDURE — 96366 THER/PROPH/DIAG IV INF ADDON: CPT

## 2025-06-09 PROCEDURE — 96375 TX/PRO/DX INJ NEW DRUG ADDON: CPT

## 2025-06-09 PROCEDURE — 80053 COMPREHEN METABOLIC PANEL: CPT

## 2025-06-09 PROCEDURE — 6360000002 HC RX W HCPCS: Performed by: EMERGENCY MEDICINE

## 2025-06-09 PROCEDURE — 96365 THER/PROPH/DIAG IV INF INIT: CPT

## 2025-06-09 PROCEDURE — 99285 EMERGENCY DEPT VISIT HI MDM: CPT

## 2025-06-09 PROCEDURE — 96372 THER/PROPH/DIAG INJ SC/IM: CPT

## 2025-06-09 PROCEDURE — 85025 COMPLETE CBC W/AUTO DIFF WBC: CPT

## 2025-06-09 PROCEDURE — 83735 ASSAY OF MAGNESIUM: CPT

## 2025-06-09 PROCEDURE — 84100 ASSAY OF PHOSPHORUS: CPT

## 2025-06-09 PROCEDURE — 6370000000 HC RX 637 (ALT 250 FOR IP): Performed by: EMERGENCY MEDICINE

## 2025-06-09 PROCEDURE — 93005 ELECTROCARDIOGRAM TRACING: CPT | Performed by: EMERGENCY MEDICINE

## 2025-06-09 PROCEDURE — 71045 X-RAY EXAM CHEST 1 VIEW: CPT

## 2025-06-09 RX ORDER — MAGNESIUM SULFATE 1 G/100ML
1000 INJECTION INTRAVENOUS ONCE
Status: COMPLETED | OUTPATIENT
Start: 2025-06-09 | End: 2025-06-09

## 2025-06-09 RX ORDER — PROMETHAZINE HYDROCHLORIDE 25 MG/ML
25 INJECTION, SOLUTION INTRAMUSCULAR; INTRAVENOUS ONCE
Status: COMPLETED | OUTPATIENT
Start: 2025-06-09 | End: 2025-06-09

## 2025-06-09 RX ORDER — ACETAMINOPHEN 500 MG
1000 TABLET ORAL ONCE
Status: COMPLETED | OUTPATIENT
Start: 2025-06-09 | End: 2025-06-09

## 2025-06-09 RX ORDER — ONDANSETRON 2 MG/ML
4 INJECTION INTRAMUSCULAR; INTRAVENOUS ONCE
Status: COMPLETED | OUTPATIENT
Start: 2025-06-09 | End: 2025-06-09

## 2025-06-09 RX ORDER — PROMETHAZINE HYDROCHLORIDE 25 MG/1
25 TABLET ORAL EVERY 6 HOURS PRN
Qty: 20 TABLET | Refills: 0 | Status: SHIPPED | OUTPATIENT
Start: 2025-06-09 | End: 2025-06-16

## 2025-06-09 RX ADMIN — PROMETHAZINE HYDROCHLORIDE 25 MG: 25 INJECTION INTRAMUSCULAR; INTRAVENOUS at 18:02

## 2025-06-09 RX ADMIN — PROMETHAZINE HYDROCHLORIDE 25 MG: 25 INJECTION INTRAMUSCULAR; INTRAVENOUS at 17:05

## 2025-06-09 RX ADMIN — ACETAMINOPHEN 1000 MG: 500 TABLET ORAL at 17:01

## 2025-06-09 RX ADMIN — ONDANSETRON 4 MG: 2 INJECTION INTRAMUSCULAR; INTRAVENOUS at 14:54

## 2025-06-09 RX ADMIN — MAGNESIUM SULFATE IN DEXTROSE 1000 MG: 10 INJECTION, SOLUTION INTRAVENOUS at 17:12

## 2025-06-09 ASSESSMENT — PAIN SCALES - GENERAL: PAINLEVEL_OUTOF10: 0

## 2025-06-09 ASSESSMENT — PAIN - FUNCTIONAL ASSESSMENT: PAIN_FUNCTIONAL_ASSESSMENT: 0-10

## 2025-06-09 NOTE — ED PROVIDER NOTES
[facility]    Emergency Department Encounter      Patient: Amisha Mao  MRN: [id number]  : 1950  Date of Evaluation: 2025  ED Provider:  Celina Eddy DO    Triage Chief Complaint:   Illness    [place]:  Amisha Mao is a 75 y.o. female who  has a past medical history of Diabetes mellitus (HCC), Hyperlipidemia, and Multiple myeloma (HCC). and presents with   Nausea and diarrhea for 3 days   Took immodium, didn't work  Lomotil, didn't work.  Zofran didn't help    Old scripts  Her doctor called in new scripts  Drinking water when she goes to the bathroom  Stomach pains on and off.  Completed dialysis this morning.      ROS - see HPI, below listed is current ROS at time of my eval:   systems reviewed and negative except as above.     Past Medical History:   Diagnosis Date    Diabetes mellitus (HCC)     Hyperlipidemia     Multiple myeloma (HCC)      Past Surgical History:   Procedure Laterality Date    KIDNEY REMOVAL Right      History reviewed. No pertinent family history.  Social History     Socioeconomic History    Marital status:      Spouse name: Not on file    Number of children: Not on file    Years of education: Not on file    Highest education level: Not on file   Occupational History    Not on file   Tobacco Use    Smoking status: Never    Smokeless tobacco: Never   Substance and Sexual Activity    Alcohol use: Never    Drug use: Never    Sexual activity: Not on file   Other Topics Concern    Not on file   Social History Narrative    Not on file     Social Drivers of Health     Financial Resource Strain: Low Risk  (2023)    Received from [facility]    Overall Financial Resource Strain (CARDIA)     Difficulty of Paying Living Expenses: Not hard at all   Food Insecurity: No Food Insecurity (3/12/2025)    Received from [facility]    NCSS - Food

## 2025-06-11 ENCOUNTER — HOSPITAL ENCOUNTER (OUTPATIENT)
Age: 75
Setting detail: SPECIMEN
Discharge: HOME | End: 2025-06-11
Payer: MEDICARE

## 2025-06-11 LAB
C DIFF GDH + TOXINS A+B STL QL IA.RAPID: NEGATIVE
EKG ATRIAL RATE: 74 BPM
EKG DIAGNOSIS: NORMAL
EKG P AXIS: 58 DEGREES
EKG P-R INTERVAL: 166 MS
EKG Q-T INTERVAL: 480 MS
EKG QRS DURATION: 130 MS
EKG QTC CALCULATION (BAZETT): 532 MS
EKG R AXIS: -25 DEGREES
EKG T AXIS: 76 DEGREES
EKG VENTRICULAR RATE: 74 BPM
SPECIMEN DESCRIPTION: NORMAL

## 2025-06-11 PROCEDURE — 93010 ELECTROCARDIOGRAM REPORT: CPT | Performed by: INTERNAL MEDICINE

## 2025-06-11 PROCEDURE — 87324 CLOSTRIDIUM AG IA: CPT

## 2025-06-11 PROCEDURE — 87449 NOS EACH ORGANISM AG IA: CPT

## 2025-06-16 ENCOUNTER — HOSPITAL ENCOUNTER (OUTPATIENT)
Age: 75
Setting detail: SPECIMEN
Discharge: HOME | End: 2025-06-16

## 2025-06-16 LAB
ALBUMIN SERPL-MCNC: 4 G/DL (ref 3.4–5)
ALBUMIN/GLOB SERPL: 2.8 {RATIO} (ref 1.1–2.2)
ALP SERPL-CCNC: 114 U/L (ref 40–129)
ALT SERPL-CCNC: 15 U/L (ref 10–40)
ANION GAP SERPL CALCULATED.3IONS-SCNC: 22 MMOL/L (ref 9–17)
AST SERPL-CCNC: 11 U/L (ref 15–37)
BILIRUB SERPL-MCNC: 0.3 MG/DL (ref 0–1)
BUN SERPL-MCNC: 59 MG/DL (ref 7–20)
CALCIUM SERPL-MCNC: 8.9 MG/DL (ref 8.3–10.6)
CHLORIDE SERPL-SCNC: 97 MMOL/L (ref 99–110)
CO2 SERPL-SCNC: 18 MMOL/L (ref 21–32)
CREAT SERPL-MCNC: 6.4 MG/DL (ref 0.6–1.2)
GFR, ESTIMATED: 6 ML/MIN/1.73M2
GLUCOSE SERPL-MCNC: 142 MG/DL (ref 74–99)
POTASSIUM SERPL-SCNC: 2.8 MMOL/L (ref 3.5–5.1)
PROT SERPL-MCNC: 5.4 G/DL (ref 6.4–8.2)
SODIUM SERPL-SCNC: 136 MMOL/L (ref 136–145)

## 2025-06-16 PROCEDURE — 80053 COMPREHEN METABOLIC PANEL: CPT

## 2025-06-20 ENCOUNTER — HOSPITAL ENCOUNTER (INPATIENT)
Age: 75
LOS: 1 days | Discharge: HOME | DRG: 391 | End: 2025-06-22
Attending: STUDENT IN AN ORGANIZED HEALTH CARE EDUCATION/TRAINING PROGRAM | Admitting: STUDENT IN AN ORGANIZED HEALTH CARE EDUCATION/TRAINING PROGRAM
Payer: MEDICARE

## 2025-06-20 ENCOUNTER — HOSPITAL ENCOUNTER (OUTPATIENT)
Age: 75
Setting detail: SPECIMEN
Discharge: HOME | End: 2025-06-20

## 2025-06-20 LAB
ANION GAP SERPL CALCULATED.3IONS-SCNC: 23 MMOL/L (ref 9–17)
BASOPHILS # BLD: 0.01 K/UL
BASOPHILS NFR BLD: 0 % (ref 0–1)
BUN SERPL-MCNC: 67 MG/DL (ref 7–20)
CALCIUM SERPL-MCNC: 8.8 MG/DL (ref 8.3–10.6)
CHLORIDE SERPL-SCNC: 104 MMOL/L (ref 99–110)
CO2 SERPL-SCNC: 11 MMOL/L (ref 21–32)
CREAT SERPL-MCNC: 7.3 MG/DL (ref 0.6–1.2)
EOSINOPHIL # BLD: 0.21 K/UL
EOSINOPHILS RELATIVE PERCENT: 5 % (ref 0–3)
ERYTHROCYTE [DISTWIDTH] IN BLOOD BY AUTOMATED COUNT: 14 % (ref 11.7–14.9)
GFR, ESTIMATED: 5 ML/MIN/1.73M2
GLUCOSE BLD-MCNC: 123 MG/DL (ref 74–99)
GLUCOSE SERPL-MCNC: 116 MG/DL (ref 74–99)
HCT VFR BLD AUTO: 30.7 % (ref 37–47)
HGB BLD-MCNC: 10.2 G/DL (ref 12.5–16)
IMM GRANULOCYTES # BLD AUTO: 0.05 K/UL
IMM GRANULOCYTES NFR BLD: 1 %
LYMPHOCYTES NFR BLD: 0.24 K/UL
LYMPHOCYTES RELATIVE PERCENT: 6 % (ref 24–44)
MCH RBC QN AUTO: 34.9 PG (ref 27–31)
MCHC RBC AUTO-ENTMCNC: 33.2 G/DL (ref 32–36)
MCV RBC AUTO: 105.1 FL (ref 78–100)
MONOCYTES NFR BLD: 0.58 K/UL
MONOCYTES NFR BLD: 13 % (ref 0–5)
NEUTROPHILS NFR BLD: 75 % (ref 36–66)
NEUTS SEG NFR BLD: 3.31 K/UL
PLATELET, FLUORESCENCE: 79 K/UL (ref 140–440)
PMV BLD AUTO: 11.9 FL (ref 7.5–11.1)
POTASSIUM SERPL-SCNC: 3.2 MMOL/L (ref 3.5–5.1)
RBC # BLD AUTO: 2.92 M/UL (ref 4.2–5.4)
SODIUM SERPL-SCNC: 138 MMOL/L (ref 136–145)
WBC OTHER # BLD: 4.4 K/UL (ref 4–10.5)

## 2025-06-20 PROCEDURE — 82962 GLUCOSE BLOOD TEST: CPT

## 2025-06-20 PROCEDURE — 87507 IADNA-DNA/RNA PROBE TQ 12-25: CPT

## 2025-06-20 PROCEDURE — 87324 CLOSTRIDIUM AG IA: CPT

## 2025-06-20 PROCEDURE — 2500000003 HC RX 250 WO HCPCS: Performed by: STUDENT IN AN ORGANIZED HEALTH CARE EDUCATION/TRAINING PROGRAM

## 2025-06-20 PROCEDURE — 87449 NOS EACH ORGANISM AG IA: CPT

## 2025-06-20 PROCEDURE — 80048 BASIC METABOLIC PNL TOTAL CA: CPT

## 2025-06-20 PROCEDURE — 85025 COMPLETE CBC W/AUTO DIFF WBC: CPT

## 2025-06-20 PROCEDURE — 2140000000 HC CCU INTERMEDIATE R&B

## 2025-06-20 PROCEDURE — 6370000000 HC RX 637 (ALT 250 FOR IP): Performed by: NURSE PRACTITIONER

## 2025-06-20 PROCEDURE — 6370000000 HC RX 637 (ALT 250 FOR IP): Performed by: STUDENT IN AN ORGANIZED HEALTH CARE EDUCATION/TRAINING PROGRAM

## 2025-06-20 RX ORDER — ONDANSETRON 2 MG/ML
4 INJECTION INTRAMUSCULAR; INTRAVENOUS EVERY 6 HOURS PRN
Status: DISCONTINUED | OUTPATIENT
Start: 2025-06-20 | End: 2025-06-22 | Stop reason: HOSPADM

## 2025-06-20 RX ORDER — ACETAMINOPHEN 650 MG/1
650 SUPPOSITORY RECTAL EVERY 6 HOURS PRN
Status: DISCONTINUED | OUTPATIENT
Start: 2025-06-20 | End: 2025-06-22 | Stop reason: HOSPADM

## 2025-06-20 RX ORDER — ROSUVASTATIN CALCIUM 20 MG/1
20 TABLET, COATED ORAL NIGHTLY
Status: DISCONTINUED | OUTPATIENT
Start: 2025-06-20 | End: 2025-06-22 | Stop reason: HOSPADM

## 2025-06-20 RX ORDER — GABAPENTIN 100 MG/1
200 CAPSULE ORAL 2 TIMES DAILY
Status: DISCONTINUED | OUTPATIENT
Start: 2025-06-20 | End: 2025-06-22 | Stop reason: HOSPADM

## 2025-06-20 RX ORDER — HYDROCORTISONE 25 MG/G
CREAM TOPICAL 2 TIMES DAILY PRN
Status: DISCONTINUED | OUTPATIENT
Start: 2025-06-20 | End: 2025-06-22 | Stop reason: HOSPADM

## 2025-06-20 RX ORDER — ONDANSETRON 4 MG/1
4 TABLET, ORALLY DISINTEGRATING ORAL EVERY 8 HOURS PRN
Status: DISCONTINUED | OUTPATIENT
Start: 2025-06-20 | End: 2025-06-22 | Stop reason: HOSPADM

## 2025-06-20 RX ORDER — SODIUM CHLORIDE 0.9 % (FLUSH) 0.9 %
5-40 SYRINGE (ML) INJECTION EVERY 12 HOURS SCHEDULED
Status: DISCONTINUED | OUTPATIENT
Start: 2025-06-20 | End: 2025-06-22 | Stop reason: HOSPADM

## 2025-06-20 RX ORDER — INSULIN GLARGINE 100 [IU]/ML
25 INJECTION, SOLUTION SUBCUTANEOUS NIGHTLY
Status: DISCONTINUED | OUTPATIENT
Start: 2025-06-20 | End: 2025-06-22 | Stop reason: HOSPADM

## 2025-06-20 RX ORDER — POTASSIUM CHLORIDE 1500 MG/1
40 TABLET, EXTENDED RELEASE ORAL ONCE
Status: COMPLETED | OUTPATIENT
Start: 2025-06-20 | End: 2025-06-21

## 2025-06-20 RX ORDER — LEVOTHYROXINE SODIUM 75 UG/1
75 TABLET ORAL
Status: DISCONTINUED | OUTPATIENT
Start: 2025-06-21 | End: 2025-06-22 | Stop reason: HOSPADM

## 2025-06-20 RX ORDER — SODIUM CHLORIDE 0.9 % (FLUSH) 0.9 %
5-40 SYRINGE (ML) INJECTION PRN
Status: DISCONTINUED | OUTPATIENT
Start: 2025-06-20 | End: 2025-06-22 | Stop reason: HOSPADM

## 2025-06-20 RX ORDER — GABAPENTIN 100 MG/1
200 CAPSULE ORAL 3 TIMES DAILY
Status: DISCONTINUED | OUTPATIENT
Start: 2025-06-20 | End: 2025-06-20

## 2025-06-20 RX ORDER — SODIUM CHLORIDE 9 MG/ML
INJECTION, SOLUTION INTRAVENOUS PRN
Status: DISCONTINUED | OUTPATIENT
Start: 2025-06-20 | End: 2025-06-22 | Stop reason: HOSPADM

## 2025-06-20 RX ORDER — FAMOTIDINE 20 MG/1
20 TABLET, FILM COATED ORAL DAILY
Status: DISCONTINUED | OUTPATIENT
Start: 2025-06-21 | End: 2025-06-22 | Stop reason: HOSPADM

## 2025-06-20 RX ORDER — ESCITALOPRAM OXALATE 10 MG/1
20 TABLET ORAL DAILY
Status: DISCONTINUED | OUTPATIENT
Start: 2025-06-21 | End: 2025-06-22 | Stop reason: HOSPADM

## 2025-06-20 RX ORDER — BENZOCAINE/MENTHOL 6 MG-10 MG
LOZENGE MUCOUS MEMBRANE 2 TIMES DAILY
Status: DISCONTINUED | OUTPATIENT
Start: 2025-06-20 | End: 2025-06-22 | Stop reason: HOSPADM

## 2025-06-20 RX ORDER — HEPARIN SODIUM 5000 [USP'U]/ML
5000 INJECTION, SOLUTION INTRAVENOUS; SUBCUTANEOUS EVERY 8 HOURS SCHEDULED
Status: DISCONTINUED | OUTPATIENT
Start: 2025-06-20 | End: 2025-06-22

## 2025-06-20 RX ORDER — INSULIN LISPRO 100 [IU]/ML
0-4 INJECTION, SOLUTION INTRAVENOUS; SUBCUTANEOUS
Status: DISCONTINUED | OUTPATIENT
Start: 2025-06-20 | End: 2025-06-22 | Stop reason: HOSPADM

## 2025-06-20 RX ORDER — ASPIRIN 81 MG/1
81 TABLET, CHEWABLE ORAL DAILY
Status: DISCONTINUED | OUTPATIENT
Start: 2025-06-21 | End: 2025-06-22 | Stop reason: HOSPADM

## 2025-06-20 RX ORDER — ACETAMINOPHEN 325 MG/1
650 TABLET ORAL EVERY 6 HOURS PRN
Status: DISCONTINUED | OUTPATIENT
Start: 2025-06-20 | End: 2025-06-22 | Stop reason: HOSPADM

## 2025-06-20 RX ORDER — GLUCAGON 1 MG/ML
1 KIT INJECTION PRN
Status: DISCONTINUED | OUTPATIENT
Start: 2025-06-20 | End: 2025-06-22 | Stop reason: HOSPADM

## 2025-06-20 RX ORDER — LANOLIN ALCOHOL/MO/W.PET/CERES
1000 CREAM (GRAM) TOPICAL DAILY
Status: DISCONTINUED | OUTPATIENT
Start: 2025-06-21 | End: 2025-06-22 | Stop reason: HOSPADM

## 2025-06-20 RX ORDER — DEXTROSE MONOHYDRATE 100 MG/ML
INJECTION, SOLUTION INTRAVENOUS CONTINUOUS PRN
Status: DISCONTINUED | OUTPATIENT
Start: 2025-06-20 | End: 2025-06-22 | Stop reason: HOSPADM

## 2025-06-20 RX ORDER — METOPROLOL SUCCINATE 50 MG/1
50 TABLET, EXTENDED RELEASE ORAL DAILY
Status: DISCONTINUED | OUTPATIENT
Start: 2025-06-21 | End: 2025-06-22 | Stop reason: HOSPADM

## 2025-06-20 RX ORDER — BUDESONIDE AND FORMOTEROL FUMARATE DIHYDRATE 80; 4.5 UG/1; UG/1
2 AEROSOL RESPIRATORY (INHALATION)
Status: DISCONTINUED | OUTPATIENT
Start: 2025-06-20 | End: 2025-06-22

## 2025-06-20 RX ORDER — VITAMIN B COMPLEX
2000 TABLET ORAL DAILY
Status: DISCONTINUED | OUTPATIENT
Start: 2025-06-21 | End: 2025-06-22 | Stop reason: HOSPADM

## 2025-06-20 RX ADMIN — INSULIN GLARGINE 25 UNITS: 100 INJECTION, SOLUTION SUBCUTANEOUS at 22:43

## 2025-06-20 RX ADMIN — MELATONIN TAB 3 MG 3 MG: 3 TAB at 22:37

## 2025-06-20 RX ADMIN — ACETAMINOPHEN 650 MG: 325 TABLET ORAL at 22:37

## 2025-06-20 RX ADMIN — SODIUM CHLORIDE, PRESERVATIVE FREE 10 ML: 5 INJECTION INTRAVENOUS at 22:40

## 2025-06-20 RX ADMIN — GABAPENTIN 200 MG: 100 CAPSULE ORAL at 22:37

## 2025-06-20 RX ADMIN — ROSUVASTATIN CALCIUM 20 MG: 20 TABLET, FILM COATED ORAL at 22:37

## 2025-06-20 ASSESSMENT — PAIN DESCRIPTION - DESCRIPTORS: DESCRIPTORS: ACHING;DISCOMFORT;SORE

## 2025-06-20 ASSESSMENT — PAIN DESCRIPTION - ORIENTATION: ORIENTATION: LOWER

## 2025-06-20 ASSESSMENT — PAIN DESCRIPTION - LOCATION: LOCATION: BACK

## 2025-06-20 ASSESSMENT — PAIN SCALES - GENERAL
PAINLEVEL_OUTOF10: 3
PAINLEVEL_OUTOF10: 1

## 2025-06-20 NOTE — H&P
History and Physical      Name:  Amisha Mao /Age/Sex: 1950  (75 y.o. female)   MRN & CSN:  [id number] & [id number] Encounter Date/Time: 2025 9:53 PM   Location:  [address] [place] [address]-A PCP: Travon Baig MD       Hospital Day: 1    Assessment and Plan:     Patient is a 75 y.o. female who presented with diarrhea. Transferred from [place] ED.     # Diarrhea  - Reported worsening non-bloody diarrhea (>4 BMs/day) over past 3 weeks, no fevers or abdominal pain. Had Keflex for presumed UTI 2 weeks prior.   - Afebrile, no leukocytosis, CT AP non-acute.   - Follow-up stool studies and CDI rule-out.     # Hypokalemia, acute on chronic  - Moderate, likely due to above.    - Repleted, follow-up repeat labs.     # ESRD on HD MF  - Non-oliguric, last treatment on  via CVC.   - Consider nephrology consult if prolonged stay.     # Multiple myeloma  - Followed by H/O at [facility].     # Hypertrophic cardiomyopathy with diastolic dysfunction s/p MVR and septal myomectomy at [facility] in 10/2020  - Last TTE 3/2025 with LVEF of 65-70%.   - Clinically euvolemic.     # Right renal malignant tumor s/p right radical nephrectomy in 2023  - Diagnosed in 2023, followed by [facility] urology, currently under surveillance only.    # Non-MI troponin elevation  - Denied any typical CP.  - Initial Tn mildly elevated, repeat flat x2, ECG without acute ischemic changes.  - Likely secondary to above, continue ASA, Crestor and Toprol-XL.    # Mixed hyperlipidemia  - Continue Crestor.    # T2DM with hyperglycemia and peripheral neuropathy  - Last A1c 6.1% in , repeat pending.   - Decrease to insulin Lantus 25 u qhs with LCSI, continue gabapentin.    # Acquired hypothyroidism  - Continue Synthroid.  - Follow-up repeat TFT.    # GERD  - Continue Pepcid.    # Depression  - Continue Lexapro.    # Class II obesity  - BMI 39.5.    Checklist:  Advanced care planning: full  Diet: cardiac and diabetic    Sugar: BG goal of 140-180    Medication Instructions    acetaminophen (TYLENOL) 1,000 mg, Oral, 2 times daily    acyclovir (ZOVIRAX) 400 mg, Oral, 2 TIMES DAILY    albuterol sulfate HFA (PROVENTIL;VENTOLIN;PROAIR) 108 (90 Base) MCG/ACT inhaler 1-2 puffs, Inhalation, 4 TIMES DAILY PRN    APPLE CIDER VINEGAR PO 2 tablets, Oral, DAILY    aspirin 81 mg, Oral, DAILY    azithromycin (ZITHROMAX) 250 mg, Oral, DAILY    BASAGLAR KWIKPEN 100 UNIT/ML injection pen 30 Units    Calcium Carbonate-Vit D-Min (CALCIUM 600+D3 PLUS MINERALS) 600-800 MG-UNIT TABS 1 tablet, Oral, 2 times daily    cyanocobalamin 1,000 mcg, Oral, DAILY    dexamethasone (DECADRON) 4 MG tablet Take 2 tabs (8mg) on days 2, 3, and 4 after chemotherapy    diphenhydrAMINE (BENADRYL) 50 mg, Oral, PRN    diphenhydrAMINE-APAP, sleep, (TYLENOL PM EXTRA STRENGTH)  MG tablet 1 tablet, Oral, NIGHTLY PRN    escitalopram (LEXAPRO) 20 MG tablet 1 tablet, Oral, DAILY    famotidine (PEPCID) 20 mg, Oral, DAILY    fluticasone furoate-vilanterol (BREO ELLIPTA) 100-25 MCG/ACT inhaler 1 puff, Inhalation, DAILY    gabapentin (NEURONTIN) 100 mg, Oral, 4 TIMES DAILY    insulin lispro, 1 Unit Dial, (HUMALOG/ADMELOG) 100 UNIT/ML SOPN INJECT 0-10 UNITS UNDER THE SKIN WITH MEALS PER SLIDING SCALE    levothyroxine (SYNTHROID) 75 MCG tablet Take by mouth four times a week Sunday,Tuesday,Thursday & Saturday.    levothyroxine (SYNTHROID) 88 MCG tablet 1 tablet, Oral, THREE TIMES WEEKLY (MONDAY, WEDNESDAY, FRIDAY)    metoprolol succinate (TOPROL XL) 50 mg, Oral, DAILY    potassium chloride (KLOR-CON M) 10 MEQ extended release tablet 10 mEq, Oral, DAILY    Probiotic Product (ACIDOPHILUS PROBIOTIC) CAPS capsule 1 capsule, Oral, DAILY    rosuvastatin (CRESTOR) 20 MG tablet 1 tablet, Oral, DAILY    Semaglutide, 1 MG/DOSE, (OZEMPIC, 1 MG/DOSE,) 4 MG/3ML SOPN 2 mg    vitamin D (CHOLECALCIFEROL) 2,000 Units, Oral, DAILY       Medications:     Medications:    [START ON 6/21/2025] aspirin  81 mg Oral Daily    insulin  glargine  25 Units SubCUTAneous Nightly    [START ON 6/21/2025] vitamin D  2,000 Units Oral Daily    [START ON 6/21/2025] cyanocobalamin  1,000 mcg Oral Daily    [START ON 6/21/2025] escitalopram  20 mg Oral Daily    [START ON 6/21/2025] famotidine  20 mg Oral Daily    budesonide-formoterol  2 puff Inhalation BID RT    gabapentin  200 mg Oral TID    [START ON 6/21/2025] levothyroxine  75 mcg Oral QAM AC    [START ON 6/21/2025] metoprolol succinate  50 mg Oral Daily    rosuvastatin  20 mg Oral Nightly    insulin lispro  0-4 Units SubCUTAneous 4x Daily AC & HS    sodium chloride flush  5-40 mL IntraVENous 2 times per day    heparin (porcine)  5,000 Units SubCUTAneous 3 times per day        Infusions:    dextrose      sodium chloride         PRN Meds:   glucose, 4 tablet, PRN  dextrose bolus, 125 mL, PRN   Or  dextrose bolus, 250 mL, PRN  glucagon (rDNA), 1 mg, PRN  dextrose, , Continuous PRN  sodium chloride flush, 5-40 mL, PRN  sodium chloride, , PRN  ondansetron, 4 mg, Q8H PRN   Or  ondansetron, 4 mg, Q6H PRN  melatonin, 3 mg, Nightly PRN  acetaminophen, 650 mg, Q6H PRN   Or  acetaminophen, 650 mg, Q6H PRN        Data:     CBC:   Recent Labs     06/20/25  0630   WBC 4.4   HGB 10.2*   .1*   RDW 14.0   LYMPHOPCT 6*   MONOPCT 13*   EOSPCT 5*   BASOPCT 0   MONOSABS 0.58   LYMPHSABS 0.24   EOSABS 0.21   BASOSABS 0.01     CMP:    Recent Labs     06/20/25  0630      K 3.2*      CO2 11*   BUN 67*   CREATININE 7.3*   GLUCOSE 116*   CALCIUM 8.8     Lipids:   Lab Results   Component Value Date/Time    HDL 39 04/05/2024 10:27 AM     Hemoglobin A1C:   Lab Results   Component Value Date/Time    LABA1C 6.1 07/15/2023 05:37 AM     TSH: No results found for: \"TSH\"  Troponin: No results found for: \"TROPONINT\"  BNP: No results for input(s): \"PROBNP\" in the last 72 hours.  Lactic Acid: No results for input(s): \"LACTA\" in the last 72 hours.  UA:  Lab Results   Component Value Date/Time    NITRU NEGATIVE 05/21/2025

## 2025-06-21 PROBLEM — Z99.2 ESRD (END STAGE RENAL DISEASE) ON DIALYSIS (HCC): Chronic | Status: ACTIVE | Noted: 2025-06-21

## 2025-06-21 PROBLEM — N18.6 ESRD (END STAGE RENAL DISEASE) ON DIALYSIS (HCC): Chronic | Status: ACTIVE | Noted: 2025-06-21

## 2025-06-21 PROBLEM — K52.9 GASTROENTERITIS: Status: ACTIVE | Noted: 2025-06-21

## 2025-06-21 LAB
25(OH)D3 SERPL-MCNC: 30.2 NG/ML (ref 30–150)
ADV 40+41 DNA STL QL NAA+NON-PROBE: DETECTED
ALBUMIN: 3.5 G/DL (ref 3.4–5)
ANION GAP SERPL CALCULATED.3IONS-SCNC: 16 MMOL/L (ref 9–17)
BASOPHILS # BLD: 0.01 K/UL
BASOPHILS NFR BLD: 0 % (ref 0–1)
BUN SERPL-MCNC: 41 MG/DL (ref 7–20)
C CAYETANENSIS DNA STL QL NAA+NON-PROBE: NOT DETECTED
C COLI+JEJ+UPSA DNA STL QL NAA+NON-PROBE: DETECTED
C DIFF GDH + TOXINS A+B STL QL IA.RAPID: NEGATIVE
CALCIUM SERPL-MCNC: 8.7 MG/DL (ref 8.3–10.6)
CHLORIDE SERPL-SCNC: 105 MMOL/L (ref 99–110)
CO2 SERPL-SCNC: 16 MMOL/L (ref 21–32)
CREAT SERPL-MCNC: 5.9 MG/DL (ref 0.6–1.2)
CRYPTOSP DNA STL QL NAA+NON-PROBE: NOT DETECTED
E COLI O157 DNA STL QL NAA+NON-PROBE: NOT DETECTED
E HISTOLYT DNA STL QL NAA+NON-PROBE: NOT DETECTED
EAEC PAA PLAS AGGR+AATA ST NAA+NON-PRB: NOT DETECTED
EC STX1+STX2 GENES STL QL NAA+NON-PROBE: NOT DETECTED
EOSINOPHIL # BLD: 0.19 K/UL
EOSINOPHILS RELATIVE PERCENT: 6 % (ref 0–3)
EPEC EAE GENE STL QL NAA+NON-PROBE: NOT DETECTED
ERYTHROCYTE [DISTWIDTH] IN BLOOD BY AUTOMATED COUNT: 14.1 % (ref 11.7–14.9)
EST. AVERAGE GLUCOSE BLD GHB EST-MCNC: 127 MG/DL
ETEC LTA+ST1A+ST1B TOX ST NAA+NON-PROBE: NOT DETECTED
G LAMBLIA DNA STL QL NAA+NON-PROBE: NOT DETECTED
GFR, ESTIMATED: 7 ML/MIN/1.73M2
GI PATH DNA+RNA PNL STL NAA+NON-PROBE: NOT DETECTED
GLUCOSE BLD-MCNC: 100 MG/DL (ref 74–99)
GLUCOSE BLD-MCNC: 122 MG/DL (ref 74–99)
GLUCOSE BLD-MCNC: 128 MG/DL (ref 74–99)
GLUCOSE BLD-MCNC: 146 MG/DL (ref 74–99)
GLUCOSE BLD-MCNC: 93 MG/DL (ref 74–99)
GLUCOSE SERPL-MCNC: 104 MG/DL (ref 74–99)
HBA1C MFR BLD: 6.1 % (ref 4.2–6.3)
HCT VFR BLD AUTO: 29.1 % (ref 37–47)
HGB BLD-MCNC: 9.6 G/DL (ref 12.5–16)
IMM GRANULOCYTES # BLD AUTO: 0.05 K/UL
IMM GRANULOCYTES NFR BLD: 2 %
INR PPP: 1.1
LYMPHOCYTES NFR BLD: 0.25 K/UL
LYMPHOCYTES RELATIVE PERCENT: 8 % (ref 24–44)
MAGNESIUM SERPL-MCNC: 1.7 MG/DL (ref 1.8–2.4)
MCH RBC QN AUTO: 35.2 PG (ref 27–31)
MCHC RBC AUTO-ENTMCNC: 33 G/DL (ref 32–36)
MCV RBC AUTO: 106.6 FL (ref 78–100)
MONOCYTES NFR BLD: 0.48 K/UL
MONOCYTES NFR BLD: 15 % (ref 0–5)
NEUTROPHILS NFR BLD: 69 % (ref 36–66)
NEUTS SEG NFR BLD: 2.13 K/UL
NOROVIRUS GI+II RNA STL QL NAA+NON-PROBE: DETECTED
P SHIGELLOIDES DNA STL QL NAA+NON-PROBE: NOT DETECTED
PHOSPHATE SERPL-MCNC: 6.8 MG/DL (ref 2.5–4.9)
PLATELET CONFIRMATION: NORMAL
PLATELET, FLUORESCENCE: 62 K/UL (ref 140–440)
PMV BLD AUTO: 11.6 FL (ref 7.5–11.1)
POTASSIUM SERPL-SCNC: 3.2 MMOL/L (ref 3.5–5.1)
PROTHROMBIN TIME: 14.8 SEC (ref 11.7–14.5)
PTH-INTACT SERPL-MCNC: 191 PG/ML (ref 14–72)
RBC # BLD AUTO: 2.73 M/UL (ref 4.2–5.4)
RVA RNA STL QL NAA+NON-PROBE: NOT DETECTED
S ENT+BONG DNA STL QL NAA+NON-PROBE: NOT DETECTED
SAPO I+II+IV+V RNA STL QL NAA+NON-PROBE: NOT DETECTED
SODIUM SERPL-SCNC: 138 MMOL/L (ref 136–145)
SOURCE: ABNORMAL
SPECIMEN DESCRIPTION: NORMAL
TSH SERPL DL<=0.05 MIU/L-ACNC: 0.61 UIU/ML (ref 0.27–4.2)
V CHOL+PARA+VUL DNA STL QL NAA+NON-PROBE: NOT DETECTED
V CHOLERAE DNA STL QL NAA+NON-PROBE: NOT DETECTED
WBC OTHER # BLD: 3.1 K/UL (ref 4–10.5)
Y ENTEROCOL DNA STL QL NAA+NON-PROBE: NOT DETECTED

## 2025-06-21 PROCEDURE — 94761 N-INVAS EAR/PLS OXIMETRY MLT: CPT

## 2025-06-21 PROCEDURE — 85610 PROTHROMBIN TIME: CPT

## 2025-06-21 PROCEDURE — 6370000000 HC RX 637 (ALT 250 FOR IP): Performed by: STUDENT IN AN ORGANIZED HEALTH CARE EDUCATION/TRAINING PROGRAM

## 2025-06-21 PROCEDURE — 85025 COMPLETE CBC W/AUTO DIFF WBC: CPT

## 2025-06-21 PROCEDURE — 83970 ASSAY OF PARATHORMONE: CPT

## 2025-06-21 PROCEDURE — 82962 GLUCOSE BLOOD TEST: CPT

## 2025-06-21 PROCEDURE — 83036 HEMOGLOBIN GLYCOSYLATED A1C: CPT

## 2025-06-21 PROCEDURE — 6360000002 HC RX W HCPCS: Performed by: INTERNAL MEDICINE

## 2025-06-21 PROCEDURE — 36415 COLL VENOUS BLD VENIPUNCTURE: CPT

## 2025-06-21 PROCEDURE — 80069 RENAL FUNCTION PANEL: CPT

## 2025-06-21 PROCEDURE — 84443 ASSAY THYROID STIM HORMONE: CPT

## 2025-06-21 PROCEDURE — 82306 VITAMIN D 25 HYDROXY: CPT

## 2025-06-21 PROCEDURE — 83735 ASSAY OF MAGNESIUM: CPT

## 2025-06-21 PROCEDURE — 6370000000 HC RX 637 (ALT 250 FOR IP): Performed by: NURSE PRACTITIONER

## 2025-06-21 PROCEDURE — 1200000000 HC SEMI PRIVATE

## 2025-06-21 PROCEDURE — 2500000003 HC RX 250 WO HCPCS: Performed by: STUDENT IN AN ORGANIZED HEALTH CARE EDUCATION/TRAINING PROGRAM

## 2025-06-21 RX ORDER — SODIUM CHLORIDE AND POTASSIUM CHLORIDE 150; 900 MG/100ML; MG/100ML
INJECTION, SOLUTION INTRAVENOUS CONTINUOUS
Status: DISCONTINUED | OUTPATIENT
Start: 2025-06-21 | End: 2025-06-22

## 2025-06-21 RX ORDER — MAGNESIUM SULFATE IN WATER 40 MG/ML
2000 INJECTION, SOLUTION INTRAVENOUS ONCE
Status: COMPLETED | OUTPATIENT
Start: 2025-06-21 | End: 2025-06-21

## 2025-06-21 RX ORDER — POTASSIUM CHLORIDE 1500 MG/1
40 TABLET, EXTENDED RELEASE ORAL ONCE
Status: DISCONTINUED | OUTPATIENT
Start: 2025-06-21 | End: 2025-06-21

## 2025-06-21 RX ADMIN — SODIUM CHLORIDE, PRESERVATIVE FREE 10 ML: 5 INJECTION INTRAVENOUS at 20:49

## 2025-06-21 RX ADMIN — ACETAMINOPHEN 650 MG: 325 TABLET ORAL at 08:43

## 2025-06-21 RX ADMIN — SODIUM CHLORIDE AND POTASSIUM CHLORIDE: .9; .15 SOLUTION INTRAVENOUS at 11:22

## 2025-06-21 RX ADMIN — POTASSIUM CHLORIDE 40 MEQ: 1500 TABLET, EXTENDED RELEASE ORAL at 01:20

## 2025-06-21 RX ADMIN — MAGNESIUM SULFATE HEPTAHYDRATE 2000 MG: 40 INJECTION, SOLUTION INTRAVENOUS at 11:22

## 2025-06-21 RX ADMIN — ASPIRIN 81 MG: 81 TABLET, CHEWABLE ORAL at 08:43

## 2025-06-21 RX ADMIN — ACETAMINOPHEN 650 MG: 325 TABLET ORAL at 20:48

## 2025-06-21 RX ADMIN — Medication 2000 UNITS: at 20:48

## 2025-06-21 RX ADMIN — HYDROCORTISONE: 0.01 CREAM TOPICAL at 20:49

## 2025-06-21 RX ADMIN — METOPROLOL SUCCINATE 50 MG: 50 TABLET, EXTENDED RELEASE ORAL at 08:43

## 2025-06-21 RX ADMIN — HYDROCORTISONE: 0.01 CREAM TOPICAL at 08:50

## 2025-06-21 RX ADMIN — GABAPENTIN 200 MG: 100 CAPSULE ORAL at 08:43

## 2025-06-21 RX ADMIN — Medication 1000 MCG: at 08:43

## 2025-06-21 RX ADMIN — LEVOTHYROXINE SODIUM 75 MCG: 0.07 TABLET ORAL at 07:19

## 2025-06-21 RX ADMIN — FAMOTIDINE 20 MG: 20 TABLET, FILM COATED ORAL at 08:43

## 2025-06-21 RX ADMIN — ESCITALOPRAM OXALATE 20 MG: 10 TABLET ORAL at 08:43

## 2025-06-21 RX ADMIN — INSULIN GLARGINE 25 UNITS: 100 INJECTION, SOLUTION SUBCUTANEOUS at 20:52

## 2025-06-21 RX ADMIN — GABAPENTIN 200 MG: 100 CAPSULE ORAL at 20:48

## 2025-06-21 RX ADMIN — ROSUVASTATIN CALCIUM 20 MG: 20 TABLET, FILM COATED ORAL at 20:48

## 2025-06-21 ASSESSMENT — PAIN DESCRIPTION - LOCATION
LOCATION: HIP
LOCATION: HIP

## 2025-06-21 ASSESSMENT — PAIN SCALES - GENERAL
PAINLEVEL_OUTOF10: 6
PAINLEVEL_OUTOF10: 2
PAINLEVEL_OUTOF10: 6

## 2025-06-21 ASSESSMENT — PAIN DESCRIPTION - DESCRIPTORS
DESCRIPTORS: ACHING;DISCOMFORT
DESCRIPTORS: ACHING;DISCOMFORT

## 2025-06-21 ASSESSMENT — PAIN DESCRIPTION - ORIENTATION
ORIENTATION: RIGHT
ORIENTATION: RIGHT

## 2025-06-21 NOTE — PLAN OF CARE
Problem: Chronic Conditions and Co-morbidities  Goal: Patient's chronic conditions and co-morbidity symptoms are monitored and maintained or improved  6/21/2025 0920 by Phylicia Keyes RN  Outcome: Progressing  6/21/2025 0138 by Fernando Caceres RN  Outcome: Progressing     Problem: Discharge Planning  Goal: Discharge to home or other facility with appropriate resources  6/21/2025 0920 by Phylicia Keyes RN  Outcome: Progressing  6/21/2025 0138 by Fernando Caceres RN  Outcome: Progressing     Problem: Pain  Goal: Verbalizes/displays adequate comfort level or baseline comfort level  6/21/2025 0920 by Phylicia Keyes RN  Outcome: Progressing  6/21/2025 0138 by Fernando Caceres RN  Outcome: Progressing     Problem: Safety - Adult  Goal: Free from fall injury  Outcome: Progressing

## 2025-06-22 VITALS
HEIGHT: 61 IN | OXYGEN SATURATION: 97 % | WEIGHT: 195.99 LBS | HEART RATE: 55 BPM | DIASTOLIC BLOOD PRESSURE: 50 MMHG | TEMPERATURE: 97.9 F | BODY MASS INDEX: 37 KG/M2 | SYSTOLIC BLOOD PRESSURE: 123 MMHG | RESPIRATION RATE: 16 BRPM

## 2025-06-22 LAB
ALBUMIN: 3.3 G/DL (ref 3.4–5)
ANION GAP SERPL CALCULATED.3IONS-SCNC: 18 MMOL/L (ref 9–17)
BASOPHILS # BLD: 0.02 K/UL
BASOPHILS NFR BLD: 1 % (ref 0–1)
BUN SERPL-MCNC: 51 MG/DL (ref 7–20)
CALCIUM SERPL-MCNC: 8.9 MG/DL (ref 8.3–10.6)
CHLORIDE SERPL-SCNC: 108 MMOL/L (ref 99–110)
CO2 SERPL-SCNC: 13 MMOL/L (ref 21–32)
CREAT SERPL-MCNC: 6.8 MG/DL (ref 0.6–1.2)
EOSINOPHIL # BLD: 0.24 K/UL
EOSINOPHILS RELATIVE PERCENT: 8 % (ref 0–3)
ERYTHROCYTE [DISTWIDTH] IN BLOOD BY AUTOMATED COUNT: 13.8 % (ref 11.7–14.9)
GFR, ESTIMATED: 6 ML/MIN/1.73M2
GLUCOSE BLD-MCNC: 109 MG/DL (ref 74–99)
GLUCOSE BLD-MCNC: 187 MG/DL (ref 74–99)
GLUCOSE SERPL-MCNC: 124 MG/DL (ref 74–99)
HCT VFR BLD AUTO: 27.8 % (ref 37–47)
HGB BLD-MCNC: 9.2 G/DL (ref 12.5–16)
IMM GRANULOCYTES # BLD AUTO: 0.06 K/UL
IMM GRANULOCYTES NFR BLD: 2 %
LYMPHOCYTES NFR BLD: 0.46 K/UL
LYMPHOCYTES RELATIVE PERCENT: 15 % (ref 24–44)
MCH RBC QN AUTO: 34.7 PG (ref 27–31)
MCHC RBC AUTO-ENTMCNC: 33.1 G/DL (ref 32–36)
MCV RBC AUTO: 104.9 FL (ref 78–100)
MONOCYTES NFR BLD: 0.51 K/UL
MONOCYTES NFR BLD: 16 % (ref 0–5)
NEUTROPHILS NFR BLD: 59 % (ref 36–66)
NEUTS SEG NFR BLD: 1.85 K/UL
PHOSPHATE SERPL-MCNC: 8.2 MG/DL (ref 2.5–4.9)
PLATELET CONFIRMATION: NORMAL
PLATELET, FLUORESCENCE: 64 K/UL (ref 140–440)
PMV BLD AUTO: 12 FL (ref 7.5–11.1)
POTASSIUM SERPL-SCNC: 3.5 MMOL/L (ref 3.5–5.1)
RBC # BLD AUTO: 2.65 M/UL (ref 4.2–5.4)
SODIUM SERPL-SCNC: 139 MMOL/L (ref 136–145)
WBC OTHER # BLD: 3.1 K/UL (ref 4–10.5)

## 2025-06-22 PROCEDURE — 82962 GLUCOSE BLOOD TEST: CPT

## 2025-06-22 PROCEDURE — 36415 COLL VENOUS BLD VENIPUNCTURE: CPT

## 2025-06-22 PROCEDURE — 80069 RENAL FUNCTION PANEL: CPT

## 2025-06-22 PROCEDURE — 6370000000 HC RX 637 (ALT 250 FOR IP): Performed by: STUDENT IN AN ORGANIZED HEALTH CARE EDUCATION/TRAINING PROGRAM

## 2025-06-22 PROCEDURE — 2500000003 HC RX 250 WO HCPCS: Performed by: STUDENT IN AN ORGANIZED HEALTH CARE EDUCATION/TRAINING PROGRAM

## 2025-06-22 PROCEDURE — 6370000000 HC RX 637 (ALT 250 FOR IP): Performed by: INTERNAL MEDICINE

## 2025-06-22 PROCEDURE — 85025 COMPLETE CBC W/AUTO DIFF WBC: CPT

## 2025-06-22 PROCEDURE — 2580000003 HC RX 258: Performed by: INTERNAL MEDICINE

## 2025-06-22 PROCEDURE — 6370000000 HC RX 637 (ALT 250 FOR IP): Performed by: NURSE PRACTITIONER

## 2025-06-22 PROCEDURE — 2500000003 HC RX 250 WO HCPCS: Performed by: INTERNAL MEDICINE

## 2025-06-22 RX ORDER — CITRIC ACID/SODIUM CITRATE 334-500MG
30 SOLUTION, ORAL ORAL 3 TIMES DAILY
Status: DISCONTINUED | OUTPATIENT
Start: 2025-06-22 | End: 2025-06-22

## 2025-06-22 RX ORDER — BUDESONIDE AND FORMOTEROL FUMARATE DIHYDRATE 80; 4.5 UG/1; UG/1
2 AEROSOL RESPIRATORY (INHALATION)
Status: DISCONTINUED | OUTPATIENT
Start: 2025-06-22 | End: 2025-06-22 | Stop reason: HOSPADM

## 2025-06-22 RX ADMIN — METOPROLOL SUCCINATE 50 MG: 50 TABLET, EXTENDED RELEASE ORAL at 09:35

## 2025-06-22 RX ADMIN — APIXABAN 5 MG: 5 TABLET, FILM COATED ORAL at 10:39

## 2025-06-22 RX ADMIN — SODIUM CITRATE AND CITRIC ACID MONOHYDRATE 30 ML: 500; 334 SOLUTION ORAL at 09:36

## 2025-06-22 RX ADMIN — ACETAMINOPHEN 650 MG: 325 TABLET ORAL at 09:39

## 2025-06-22 RX ADMIN — FAMOTIDINE 20 MG: 20 TABLET, FILM COATED ORAL at 09:35

## 2025-06-22 RX ADMIN — SODIUM CHLORIDE, PRESERVATIVE FREE 10 ML: 5 INJECTION INTRAVENOUS at 09:36

## 2025-06-22 RX ADMIN — SODIUM BICARBONATE: 84 INJECTION, SOLUTION INTRAVENOUS at 09:46

## 2025-06-22 RX ADMIN — ESCITALOPRAM OXALATE 20 MG: 10 TABLET ORAL at 09:35

## 2025-06-22 RX ADMIN — GABAPENTIN 200 MG: 100 CAPSULE ORAL at 09:35

## 2025-06-22 RX ADMIN — LEVOTHYROXINE SODIUM 75 MCG: 0.07 TABLET ORAL at 05:37

## 2025-06-22 RX ADMIN — INSULIN LISPRO 1 UNITS: 100 INJECTION, SOLUTION INTRAVENOUS; SUBCUTANEOUS at 13:11

## 2025-06-22 RX ADMIN — Medication 1000 MCG: at 09:35

## 2025-06-22 ASSESSMENT — PAIN SCALES - GENERAL
PAINLEVEL_OUTOF10: 0
PAINLEVEL_OUTOF10: 0
PAINLEVEL_OUTOF10: 4

## 2025-06-22 ASSESSMENT — PAIN DESCRIPTION - LOCATION: LOCATION: BACK

## 2025-06-22 ASSESSMENT — PAIN DESCRIPTION - ORIENTATION: ORIENTATION: LOWER

## 2025-06-22 ASSESSMENT — PAIN - FUNCTIONAL ASSESSMENT: PAIN_FUNCTIONAL_ASSESSMENT: ACTIVITIES ARE NOT PREVENTED

## 2025-06-22 ASSESSMENT — PAIN SCALES - WONG BAKER: WONGBAKER_NUMERICALRESPONSE: NO HURT

## 2025-06-22 ASSESSMENT — PAIN DESCRIPTION - DESCRIPTORS: DESCRIPTORS: ACHING

## 2025-06-22 NOTE — PLAN OF CARE
Problem: Chronic Conditions and Co-morbidities  Goal: Patient's chronic conditions and co-morbidity symptoms are monitored and maintained or improved  6/22/2025 1008 by Ike Ornelas RN  Outcome: Progressing  6/21/2025 2155 by Wendi Otoole RN  Outcome: Progressing     Problem: Discharge Planning  Goal: Discharge to home or other facility with appropriate resources  6/22/2025 1008 by Ike Ornelas RN  Outcome: Progressing  6/21/2025 2155 by Wendi Otoole RN  Outcome: Progressing     Problem: Pain  Goal: Verbalizes/displays adequate comfort level or baseline comfort level  6/22/2025 1008 by Ike Ornelas RN  Outcome: Progressing  6/21/2025 2155 by Wendi Otoole RN  Outcome: Progressing     Problem: Safety - Adult  Goal: Free from fall injury  6/22/2025 1008 by Ike Ornelas RN  Outcome: Progressing  6/21/2025 2155 by Wendi Otoole RN  Outcome: Progressing     Problem: ABCDS Injury Assessment  Goal: Absence of physical injury  Outcome: Progressing

## 2025-06-22 NOTE — PLAN OF CARE
Problem: Chronic Conditions and Co-morbidities  Goal: Patient's chronic conditions and co-morbidity symptoms are monitored and maintained or improved  6/21/2025 2155 by Wendi Otoole RN  Outcome: Progressing  6/21/2025 0920 by Phylicia Keyes RN  Outcome: Progressing     Problem: Discharge Planning  Goal: Discharge to home or other facility with appropriate resources  6/21/2025 2155 by Wendi Otoole RN  Outcome: Progressing  6/21/2025 0920 by Phylicia Keyes RN  Outcome: Progressing     Problem: Pain  Goal: Verbalizes/displays adequate comfort level or baseline comfort level  6/21/2025 2155 by Wendi Otoole RN  Outcome: Progressing  6/21/2025 0920 by Phylicia Keyes RN  Outcome: Progressing     Problem: Safety - Adult  Goal: Free from fall injury  6/21/2025 2155 by Wendi Otoole RN  Outcome: Progressing  6/21/2025 0920 by Phylicia Keyes RN  Outcome: Progressing

## 2025-06-22 NOTE — DISCHARGE SUMMARY
Discharge Summary    Name:  Amisha Mao /Age/Sex: 1950  (75 y.o. female)   MRN & CSN:  [id number] & [id number] Admission Date/Time: 2025  9:43 PM   Attending:  Ashley Hughes MD Discharging Physician: Ashley Hughes MD         Discharge Diagnosis:  Gastroenteritis  Hypokalemia  ESRD  Multiple myeloma      Discharge Exam  Physical Exam  Vitals:    25 2232 25 0308 25 0751 25 0800   BP: (!) 97/48 (!) 112/48 (!) 110/44    Pulse: 56 57 54 55   Resp: 17 15 16    Temp: 97.9 °F (36.6 °C) 98.1 °F (36.7 °C) 97.7 °F (36.5 °C)    TempSrc: Oral Oral Oral    SpO2: 95% 96% 93%    Weight:       Height:            General: NAD  Eyes: EOMI  ENT: neck supple  Cardiovascular: Regular rate.  Respiratory: Clear to auscultation  Gastrointestinal: Soft, non tender  Genitourinary: no suprapubic tenderness  Musculoskeletal: No edema  Skin: warm, dry  Neuro: Alert.  Psych: Mood appropriate.      Hospital Course:   Amisha Mao is a 75 y.o.  female  who presents with Gastroenteritis         # Diarrhea  - Reported worsening non-bloody diarrhea (>4 BMs/day) over past 3 weeks, no fevers or abdominal pain. Had Keflex for presumed UTI 2 weeks prior.   - Afebrile, no leukocytosis, CT AP non-acute.   - CTA negative  -Gastrointestinal panel positive for astrovirus, norovirus and Campylobacter  -Patient's diarrhea is improving on supportive management  -Advised patient to stay hydrated, drink electrolyte rich fluid like Gatorade       # Hypokalemia, acute on chronic  - Moderate, likely due to above.    - Repleted     # ESRD on HD MF  - Non-oliguric, last treatment on  via CVC, next treatment on 2025  - Worsening acidemia likely due to diarrhea.  Patient to receive bicarb infusion for few hours today prior to discharge.     # Multiple myeloma  - Followed by H/O at [facility].      # Hypertrophic cardiomyopathy with diastolic dysfunction s/p MVR and septal myomectomy at [facility]

## 2025-06-22 NOTE — CONSULTS
Nephrology Service Consultation      [facility] [id number]  Phone: [phone]  Office Hours: 8:30AM - 4:30PM  Monday - Friday        MEDICAL DECISION MAKING and Recommendations     -Diarrhea  -Nausea  -Acidemia  -Hypokalemia  -ESRD on HD M/F  -CKD-MBD; hyperphosphatemia    Suggest:  -Give oral bicitra and give 1L of bicarb drip  -Next HD tomorrow whether inpatient or not  -Resume phos binders once able to tolerate oral intake  -If the plan is to dc her today then let her have 500ml of the bicarb drip at least, before dc    Thank you          Patient Active Problem List    Diagnosis Date Noted    Gastroenteritis 06/21/2025    ESRD (end stage renal disease) on dialysis ([facility]) 06/21/2025    Secondary hyperparathyroidism of renal origin 09/17/2024    Chronic kidney disease, stage IV (severe) ([facility]) 08/01/2023    Hypomagnesemia 08/01/2023    SANDRA (acute kidney injury) 07/14/2023         Patient:  Amisha Mao  MRN: [id number]  Consulting physician:  Ashley Hughes MD  Reason for Consult:   PCP: Travon Baig MD    HISTORY OF PRESENT ILLNESS:   The patient is a 75 y.o. female with multiple myeloma, solitary left kidney and ESRD on HD M/F, presented due to diarrhea and nausea.  Nephrology consult for she is known to the practice. She has ESRD and goes to HD on Mondays and Fridays.  She is on room air today  She reports one bowel movement overnight  She is hemodynamically stable  She reports antibiotic use about 3 wks ago    REVIEW OF SYSTEMS:  14 point ROS is Negative. See positive ROS per HPI    Past Medical History:        Diagnosis Date    Diabetes mellitus (HCC)     ESRD (end stage renal disease) on dialysis ([facility]) 06/20/2025    ESRD (end stage renal disease) on dialysis ([facility]) 06/21/2025    PATIENT OF DR RANJAN RUELAS      Hyperlipidemia     Multiple myeloma ([facility])        Past Surgical History:        Procedure Laterality Date    KIDNEY REMOVAL Right        Medications:   Prior to Admission  MD Miller   diphenhydrAMINE-APAP, sleep, (TYLENOL PM EXTRA STRENGTH)  MG tablet Take 1 tablet by mouth nightly as needed    Miller Rene MD   diphenhydrAMINE (BENADRYL) 50 MG capsule Take 1 capsule by mouth as needed    Miller Rene MD   escitalopram (LEXAPRO) 20 MG tablet Take 1 tablet by mouth daily 12/14/22   Miller Rene MD   fluticasone furoate-vilanterol (BREO ELLIPTA) 100-25 MCG/ACT inhaler Inhale 1 puff into the lungs daily 11/13/20   Miller Rene MD   BASAGLAR KWIKPEN 100 UNIT/ML injection pen 30 Units 6/15/23   Miller Rene MD   insulin lispro, 1 Unit Dial, (HUMALOG/ADMELOG) 100 UNIT/ML SOPN INJECT 0-10 UNITS UNDER THE SKIN WITH MEALS PER SLIDING SCALE 11/6/20   Miller Rene MD   levothyroxine (SYNTHROID) 75 MCG tablet Take by mouth four times a week Sunday,Tuesday,Thursday & Saturday. 6/30/23   Miller Rene MD   levothyroxine (SYNTHROID) 88 MCG tablet Take 1 tablet by mouth three times a week 1/31/23   Miller Rene MD   metoprolol succinate (TOPROL XL) 50 MG extended release tablet Take 1 tablet by mouth daily 1/27/21   Miller Rene MD   Semaglutide, 1 MG/DOSE, (OZEMPIC, 1 MG/DOSE,) 4 MG/3ML SOPN 2 mg 8/24/22   Miller Rene MD        Allergies:  Levofloxacin, Medroxyprogesterone, Nitrofurantoin macrocrystal, Shellfish allergy, Eicosapentaenoic acid, Fish-derived products, Iodinated contrast media, Iodine, Moxifloxacin, Adhesive tape, Clarithromycin, Hepatitis a-hep b recomb vac, Hepatitis b vaccine, Hepatitis b vaccine recomb (3-antigen), Sulfa antibiotics, and Sulfasalazine    Social History:   TOBACCO:   reports that she has never smoked. She has never used smokeless tobacco.  ETOH:   reports no history of alcohol use.  OCCUPATION:      Family History:   No family history on file.  Physical Exam:    Vitals: BP (!) 112/48   Pulse 57   Temp 98.1 °F (36.7 °C) (Oral)   Resp 15   Ht 1.549 m (5' 1\")   Wt

## 2025-06-23 ENCOUNTER — HOSPITAL ENCOUNTER (OUTPATIENT)
Age: 75
Setting detail: SPECIMEN
Discharge: HOME | End: 2025-06-23

## 2025-06-23 LAB
ANION GAP SERPL CALCULATED.3IONS-SCNC: 19 MMOL/L (ref 9–17)
BUN SERPL-MCNC: 63 MG/DL (ref 7–20)
CALCIUM SERPL-MCNC: 8.6 MG/DL (ref 8.3–10.6)
CHLORIDE SERPL-SCNC: 104 MMOL/L (ref 99–110)
CO2 SERPL-SCNC: 15 MMOL/L (ref 21–32)
CREAT SERPL-MCNC: 6.9 MG/DL (ref 0.6–1.2)
GFR, ESTIMATED: 6 ML/MIN/1.73M2
GLUCOSE SERPL-MCNC: 103 MG/DL (ref 74–99)
POTASSIUM SERPL-SCNC: 3.4 MMOL/L (ref 3.5–5.1)
SODIUM SERPL-SCNC: 138 MMOL/L (ref 136–145)

## 2025-06-23 PROCEDURE — 80048 BASIC METABOLIC PNL TOTAL CA: CPT

## 2025-06-30 ENCOUNTER — HOSPITAL ENCOUNTER (OUTPATIENT)
Age: 75
Setting detail: SPECIMEN
Discharge: HOME | End: 2025-06-30

## 2025-06-30 LAB
ANION GAP SERPL CALCULATED.3IONS-SCNC: 17 MMOL/L (ref 9–17)
BUN SERPL-MCNC: 67 MG/DL (ref 7–20)
CALCIUM SERPL-MCNC: 8.9 MG/DL (ref 8.3–10.6)
CHLORIDE SERPL-SCNC: 107 MMOL/L (ref 99–110)
CO2 SERPL-SCNC: 20 MMOL/L (ref 21–32)
CREAT SERPL-MCNC: 5.5 MG/DL (ref 0.6–1.2)
GFR, ESTIMATED: 8 ML/MIN/1.73M2
GLUCOSE SERPL-MCNC: 190 MG/DL (ref 74–99)
POTASSIUM SERPL-SCNC: 5.9 MMOL/L (ref 3.5–5.1)
SODIUM SERPL-SCNC: 143 MMOL/L (ref 136–145)

## 2025-06-30 PROCEDURE — 80048 BASIC METABOLIC PNL TOTAL CA: CPT

## 2025-07-04 ENCOUNTER — HOSPITAL ENCOUNTER (OUTPATIENT)
Age: 75
Setting detail: SPECIMEN
Discharge: HOME | End: 2025-07-04

## 2025-07-04 PROCEDURE — 80048 BASIC METABOLIC PNL TOTAL CA: CPT

## 2025-07-07 ENCOUNTER — HOSPITAL ENCOUNTER (OUTPATIENT)
Age: 75
Setting detail: SPECIMEN
Discharge: HOME | End: 2025-07-07

## 2025-07-07 LAB
ANION GAP SERPL CALCULATED.3IONS-SCNC: 19 MMOL/L (ref 9–17)
BUN SERPL-MCNC: 56 MG/DL (ref 7–20)
CALCIUM SERPL-MCNC: 9 MG/DL (ref 8.3–10.6)
CHLORIDE SERPL-SCNC: 104 MMOL/L (ref 99–110)
CO2 SERPL-SCNC: 22 MMOL/L (ref 21–32)
CREAT SERPL-MCNC: 4.8 MG/DL (ref 0.6–1.2)
GFR, ESTIMATED: 9 ML/MIN/1.73M2
GLUCOSE SERPL-MCNC: 157 MG/DL (ref 74–99)
POTASSIUM SERPL-SCNC: 3.7 MMOL/L (ref 3.5–5.1)
SODIUM SERPL-SCNC: 145 MMOL/L (ref 136–145)

## 2025-07-07 PROCEDURE — 80048 BASIC METABOLIC PNL TOTAL CA: CPT

## 2025-07-08 LAB
ANION GAP SERPL CALCULATED.3IONS-SCNC: 18 MMOL/L (ref 9–17)
BUN SERPL-MCNC: 66 MG/DL (ref 7–20)
CALCIUM SERPL-MCNC: 8.8 MG/DL (ref 8.3–10.6)
CHLORIDE SERPL-SCNC: 109 MMOL/L (ref 99–110)
CO2 SERPL-SCNC: 19 MMOL/L (ref 21–32)
CREAT SERPL-MCNC: 5.3 MG/DL (ref 0.6–1.2)
GFR, ESTIMATED: 8 ML/MIN/1.73M2
GLUCOSE SERPL-MCNC: 171 MG/DL (ref 74–99)
POTASSIUM SERPL-SCNC: 4.5 MMOL/L (ref 3.5–5.1)
SODIUM SERPL-SCNC: 146 MMOL/L (ref 136–145)

## 2025-07-10 ENCOUNTER — HOSPITAL ENCOUNTER (OUTPATIENT)
Age: 75
Setting detail: SPECIMEN
Discharge: HOME | End: 2025-07-10
Payer: MEDICARE

## 2025-07-10 LAB
BILIRUB UR QL STRIP: NEGATIVE
CLARITY UR: CLEAR
COLOR UR: YELLOW
EPI CELLS #/AREA URNS HPF: NORMAL /HPF
GLUCOSE UR STRIP-MCNC: NEGATIVE MG/DL
HGB UR QL STRIP.AUTO: ABNORMAL
KETONES UR STRIP-MCNC: NEGATIVE MG/DL
LEUKOCYTE ESTERASE UR QL STRIP: ABNORMAL
NITRITE UR QL STRIP: NEGATIVE
PH UR STRIP: 6 [PH] (ref 5–8)
PROT UR STRIP-MCNC: NEGATIVE MG/DL
RBC #/AREA URNS HPF: NORMAL /HPF
SP GR UR STRIP: 1.01 (ref 1–1.03)
UROBILINOGEN UR STRIP-ACNC: 0.2 EU/DL (ref 0–1)
WBC #/AREA URNS HPF: NORMAL /HPF

## 2025-07-10 PROCEDURE — 87186 SC STD MICRODIL/AGAR DIL: CPT

## 2025-07-10 PROCEDURE — 87086 URINE CULTURE/COLONY COUNT: CPT

## 2025-07-10 PROCEDURE — 81001 URINALYSIS AUTO W/SCOPE: CPT

## 2025-07-10 PROCEDURE — 87077 CULTURE AEROBIC IDENTIFY: CPT

## 2025-07-12 NOTE — PROGRESS NOTES
V2.0  [facility] Hospitalist Progress Note      Name:  Amisha Mao /Age/Sex: 1950  (75 y.o. female)   MRN & CSN:  [id number] & [id number] Encounter Date/Time: 2025 2:05 PM EDT    [facility]  [facility] PCP: Travon Baig MD       Hospital Day: 2    Assessment and Plan:   Amisha Mao is a 75 y.o. female with pmh of  who presents with Gastroenteritis      Plan:      # Diarrhea  - Reported worsening non-bloody diarrhea (>4 BMs/day) over past 3 weeks, no fevers or abdominal pain. Had Keflex for presumed UTI 2 weeks prior.   - Afebrile, no leukocytosis, CT AP non-acute.   - CTA negative  -Gastrointestinal panel pending.      # Hypokalemia, acute on chronic  - Moderate, likely due to above.    - Repleted     # ESRD on HD MF  - Non-oliguric, last treatment on  via CVC.   - Will consult nephrology     # Multiple myeloma  - Followed by H/O at [facility].      # Hypertrophic cardiomyopathy with diastolic dysfunction s/p MVR and septal myomectomy at [facility] in 10/2020  - Last TTE 3/2025 with LVEF of 65-70%.   - Clinically euvolemic.      # Right renal malignant tumor s/p right radical nephrectomy in 2023  - Diagnosed in 2023, followed by [facility] urology, currently under surveillance only.     # Non-MI troponin elevation  - Denied any typical CP.  - Initial Tn mildly elevated, repeat flat x2, ECG without acute ischemic changes.  - Likely secondary to above, continue ASA, Crestor and Toprol-XL.     # Mixed hyperlipidemia  - Continue Crestor.     # T2DM with hyperglycemia and peripheral neuropathy  - Last A1c 6.1% in , repeat pending.   - Decrease to insulin Lantus 25 u qhs with LCSI, continue gabapentin.     # Acquired hypothyroidism  - Continue Synthroid.  - Follow-up repeat TFT.     # GERD  - Continue Pepcid.     # Depression  - Continue Lexapro.     # Class II obesity  - BMI 39.5.          Diet ADULT DIET; Regular; 5 carb choices (75 gm/meal); No Added Salt (3-4 gm); Low Phosphorus 
  Physician Progress Note      PATIENT:               CARMINE LUNA  CSN #:                  [id number]  :                       1950  ADMIT DATE:       2025 9:43 PM  DISCH DATE:        2025 4:30 PM  RESPONDING  PROVIDER #:        Ashley Hughes MD          QUERY TEXT:    Gastroenteritis is documented in the Discharge Summary by Ashley Hughes MD at 2025.  Please document the most likely cause:    The clinical indicators include:  -\"Reported worsening non-bloody diarrhea (>4 BMs/day) over past 3 weeks, no   fevers or abdominal pain. Had Keflex for presumed UTI 2 weeks prior.\"   (Discharge Summary by Ashley Hughes MD at 2025)  -Gastrointestinal panel positive for Astrovirus, Norovirus and Campylobacter.  Options provided:  -- Gastroenteritis due to astrovirus  -- Gastroenteritis due to norovirus  -- Gastroenteritis due to Campylobacter  -- Gastroenteritis due to use of prescribed Keflex  -- Gastroenteritis due to astrovirus, norovirus, and Campylobacter  -- Gastroenteritis due to astrovirus, norovirus, Campylobacter and use of   prescribed Keflex  -- Other - I will add my own diagnosis  -- Disagree - Not applicable / Not valid  -- Disagree - Clinically unable to determine / Unknown  -- Refer to Clinical Documentation Reviewer    PROVIDER RESPONSE TEXT:    This patient has gastroenteritis due to astrovirus, norovirus, and   Campylobacter.    Query created by: Pancho Wilkinson on 7/10/2025 10:07 AM      Electronically signed by:  Ashley Hughes MD 2025 3:56 PM          
4 Eyes Skin Assessment     NAME:  Amisha Mao  YOB: 1950  MEDICAL RECORD NUMBER:  [id number]    The patient is being assess for  Transfer to New Unit    I agree that 2 RN's have performed a thorough Head to Toe Skin Assessment on the patient. ALL assessment sites listed below have been assessed.      Areas assessed by both nurses:    Head, Face, Ears, Shoulders, Back, Chest, Arms, Elbows, Hands, Sacrum. Buttock, Coccyx, Ischium, Legs. Feet and Heels, and Under Medical Devices   - MASD jimmy anal area  - bruising left thigh medial        Does the Patient have a Wound? No noted wound(s)       Burke Prevention initiated:  NA   Wound Care Orders initiated:  NA    Pressure Injury (Stage 3,4, Unstageable, DTI, NWPT, and Complex wounds) if present place consult order under :: NA    New and Established Ostomies if present place consult order under : NA      Nurse 1 eSignature: Electronically signed by Miguel A Owens RN on 6/21/25 at 10:39 PM EDT    **SHARE this note so that the co-signing nurse is able to place an eSignature**    Nurse 2 eSignature: Electronically signed by Monie Oscar RN on 6/21/25 at 10:57 PM EDT         
4 Eyes Skin Assessment     NAME:  Amisha Mao  YOB: 1950  MEDICAL RECORD NUMBER:  [id number]    The patient is being assessed for  Transfer to New Unit    I agree that at least one RN has performed a thorough Head to Toe Skin Assessment on the patient. ALL assessment sites listed below have been assessed.      Areas assessed by both nurses:    Head, Face, Ears, Shoulders, Back, Chest, Arms, Elbows, Hands, Sacrum. Buttock, Coccyx, Ischium, Legs. Feet and Heels, Under Medical Devices , and Other .        Does the Patient have a Wound? No noted wound(s)       Burke Prevention initiated by RN: No  Wound Care Orders initiated by RN: No    Pressure Injury (Stage 3,4, Unstageable, DTI, NWPT, and Complex wounds) if present, place Wound referral order by RN under : No    New Ostomies, if present place, Ostomy referral order under : No     Nurse 1 eSignature: Electronically signed by Fernando Caceres RN on 6/21/25 at 4:37 AM EDT    **SHARE this note so that the co-signing nurse can place an eSignature**    Nurse 2 eSignature: Electronically signed by Abbi Bliss RN  
Admitted last night    Diarrhea  Volume depletion  Hypokalemia  ESRD on HD  Multiple myeloma    Start NS+20MEQ KCl/liter at 50ml/hr  HD PLAnned for Monday      
Called report to [facility] on 1N for transfer to med surg room 1126.  
Dr. Serrano's office patient  I have forwarded to Dr. Lockhart  
Patient chart evaluated per nursing staff request. Patient vitals stable, stools becoming less frequent. ESRD, per nephrology note plan for HD on Monday. Hypokalemia improved. Volume repletion per nephrology. Per nursing flowsheet  documentation patient is Awake and alert, NAD. Appropriate for medsurg transfer at this time. Transfer order placed.     HR: 59 RR 16 SBP: 11/49 (67) Spo2: 98% on room Air    Electronically signed by LEIGHTON Matute NP on 6/21/2025 at 8:18 PM          
due to mod-severe pain in L LE/unable to perform

## 2025-07-13 LAB
MICROORGANISM SPEC CULT: ABNORMAL
MICROORGANISM SPEC CULT: ABNORMAL
SPECIMEN DESCRIPTION: ABNORMAL

## 2025-07-14 ENCOUNTER — HOSPITAL ENCOUNTER (OUTPATIENT)
Age: 75
Setting detail: SPECIMEN
Discharge: HOME | End: 2025-07-14

## 2025-07-14 LAB
ANION GAP SERPL CALCULATED.3IONS-SCNC: 17 MMOL/L (ref 9–17)
BUN SERPL-MCNC: 49 MG/DL (ref 7–20)
CALCIUM SERPL-MCNC: 9.2 MG/DL (ref 8.3–10.6)
CHLORIDE SERPL-SCNC: 102 MMOL/L (ref 99–110)
CO2 SERPL-SCNC: 21 MMOL/L (ref 21–32)
CREAT SERPL-MCNC: 4.7 MG/DL (ref 0.6–1.2)
GFR, ESTIMATED: 9 ML/MIN/1.73M2
GLUCOSE SERPL-MCNC: 196 MG/DL (ref 74–99)
POTASSIUM SERPL-SCNC: 4.2 MMOL/L (ref 3.5–5.1)
SODIUM SERPL-SCNC: 140 MMOL/L (ref 136–145)

## 2025-07-14 PROCEDURE — 80048 BASIC METABOLIC PNL TOTAL CA: CPT

## 2025-07-21 ENCOUNTER — HOSPITAL ENCOUNTER (OUTPATIENT)
Age: 75
Setting detail: SPECIMEN
Discharge: HOME | End: 2025-07-21

## 2025-07-21 LAB
ANION GAP SERPL CALCULATED.3IONS-SCNC: 15 MMOL/L (ref 9–17)
BUN SERPL-MCNC: 58 MG/DL (ref 7–20)
CALCIUM SERPL-MCNC: 9 MG/DL (ref 8.3–10.6)
CHLORIDE SERPL-SCNC: 106 MMOL/L (ref 99–110)
CO2 SERPL-SCNC: 23 MMOL/L (ref 21–32)
CREAT SERPL-MCNC: 4.4 MG/DL (ref 0.6–1.2)
GFR, ESTIMATED: 10 ML/MIN/1.73M2
GLUCOSE SERPL-MCNC: 259 MG/DL (ref 74–99)
POTASSIUM SERPL-SCNC: 4.2 MMOL/L (ref 3.5–5.1)
SODIUM SERPL-SCNC: 144 MMOL/L (ref 136–145)

## 2025-07-21 PROCEDURE — 80048 BASIC METABOLIC PNL TOTAL CA: CPT

## 2025-07-28 ENCOUNTER — HOSPITAL ENCOUNTER (OUTPATIENT)
Age: 75
Setting detail: SPECIMEN
Discharge: HOME | End: 2025-07-28

## 2025-07-28 LAB
ANION GAP SERPL CALCULATED.3IONS-SCNC: 16 MMOL/L (ref 9–17)
BUN SERPL-MCNC: 51 MG/DL (ref 7–20)
CALCIUM SERPL-MCNC: 9.2 MG/DL (ref 8.3–10.6)
CHLORIDE SERPL-SCNC: 101 MMOL/L (ref 99–110)
CO2 SERPL-SCNC: 23 MMOL/L (ref 21–32)
CREAT SERPL-MCNC: 4.8 MG/DL (ref 0.6–1.2)
GFR, ESTIMATED: 9 ML/MIN/1.73M2
GLUCOSE SERPL-MCNC: 169 MG/DL (ref 74–99)
POTASSIUM SERPL-SCNC: 4.5 MMOL/L (ref 3.5–5.1)
SODIUM SERPL-SCNC: 140 MMOL/L (ref 136–145)

## 2025-07-28 PROCEDURE — 80048 BASIC METABOLIC PNL TOTAL CA: CPT

## 2025-08-04 ENCOUNTER — HOSPITAL ENCOUNTER (OUTPATIENT)
Age: 75
Setting detail: SPECIMEN
Discharge: HOME | End: 2025-08-04

## 2025-08-04 LAB
ANION GAP SERPL CALCULATED.3IONS-SCNC: 16 MMOL/L (ref 9–17)
BUN SERPL-MCNC: 61 MG/DL (ref 7–20)
CALCIUM SERPL-MCNC: 9.3 MG/DL (ref 8.3–10.6)
CHLORIDE SERPL-SCNC: 99 MMOL/L (ref 99–110)
CO2 SERPL-SCNC: 24 MMOL/L (ref 21–32)
CREAT SERPL-MCNC: 4.5 MG/DL (ref 0.6–1.2)
GFR, ESTIMATED: 10 ML/MIN/1.73M2
GLUCOSE SERPL-MCNC: 193 MG/DL (ref 74–99)
POTASSIUM SERPL-SCNC: 4.2 MMOL/L (ref 3.5–5.1)
SODIUM SERPL-SCNC: 139 MMOL/L (ref 136–145)

## 2025-08-04 PROCEDURE — 80048 BASIC METABOLIC PNL TOTAL CA: CPT

## 2025-08-18 ENCOUNTER — HOSPITAL ENCOUNTER (OUTPATIENT)
Age: 75
Setting detail: SPECIMEN
Discharge: HOME | End: 2025-08-18

## 2025-08-18 LAB
ANION GAP SERPL CALCULATED.3IONS-SCNC: 17 MMOL/L (ref 9–17)
BUN SERPL-MCNC: 69 MG/DL (ref 7–20)
CALCIUM SERPL-MCNC: 9.4 MG/DL (ref 8.3–10.6)
CHLORIDE SERPL-SCNC: 100 MMOL/L (ref 99–110)
CO2 SERPL-SCNC: 24 MMOL/L (ref 21–32)
CREAT SERPL-MCNC: 5 MG/DL (ref 0.6–1.2)
GFR, ESTIMATED: 9 ML/MIN/1.73M2
GLUCOSE SERPL-MCNC: 233 MG/DL (ref 74–99)
POTASSIUM SERPL-SCNC: 4.3 MMOL/L (ref 3.5–5.1)
SODIUM SERPL-SCNC: 141 MMOL/L (ref 136–145)

## 2025-08-18 PROCEDURE — 80048 BASIC METABOLIC PNL TOTAL CA: CPT

## 2025-08-21 ENCOUNTER — TRANSCRIBE ORDERS (OUTPATIENT)
Dept: ADMINISTRATIVE | Age: 75
End: 2025-08-21

## 2025-08-21 DIAGNOSIS — M54.16 SPINAL STENOSIS OF LUMBAR REGION WITH RADICULOPATHY: ICD-10-CM

## 2025-08-21 DIAGNOSIS — M48.061 SPINAL STENOSIS OF LUMBAR REGION WITH RADICULOPATHY: ICD-10-CM

## 2025-08-21 DIAGNOSIS — M54.50 SEVERE LUMBAR PAIN: ICD-10-CM

## 2025-08-21 DIAGNOSIS — M47.26 OTHER SPONDYLOSIS WITH RADICULOPATHY, LUMBAR REGION: Primary | ICD-10-CM

## 2025-08-25 ENCOUNTER — HOSPITAL ENCOUNTER (OUTPATIENT)
Age: 75
Setting detail: SPECIMEN
Discharge: HOME | End: 2025-08-25

## 2025-08-25 LAB
ALBUMIN SERPL-MCNC: 4.2 G/DL (ref 3.4–5)
ALBUMIN/GLOB SERPL: 3 {RATIO} (ref 1.1–2.2)
ALP SERPL-CCNC: 126 U/L (ref 40–129)
ALT SERPL-CCNC: 10 U/L (ref 10–40)
ANION GAP SERPL CALCULATED.3IONS-SCNC: 17 MMOL/L (ref 9–17)
AST SERPL-CCNC: 9 U/L (ref 15–37)
BILIRUB SERPL-MCNC: 0.3 MG/DL (ref 0–1)
BUN SERPL-MCNC: 53 MG/DL (ref 7–20)
CALCIUM SERPL-MCNC: 9.6 MG/DL (ref 8.3–10.6)
CHLORIDE SERPL-SCNC: 97 MMOL/L (ref 99–110)
CO2 SERPL-SCNC: 24 MMOL/L (ref 21–32)
CREAT SERPL-MCNC: 4.2 MG/DL (ref 0.6–1.2)
GFR, ESTIMATED: 10 ML/MIN/1.73M2
GLUCOSE SERPL-MCNC: 236 MG/DL (ref 74–99)
POTASSIUM SERPL-SCNC: 4.2 MMOL/L (ref 3.5–5.1)
PROT SERPL-MCNC: 5.7 G/DL (ref 6.4–8.2)
SODIUM SERPL-SCNC: 138 MMOL/L (ref 136–145)

## 2025-08-25 PROCEDURE — 80053 COMPREHEN METABOLIC PANEL: CPT
